# Patient Record
Sex: FEMALE | Race: WHITE | NOT HISPANIC OR LATINO | Employment: FULL TIME | URBAN - METROPOLITAN AREA
[De-identification: names, ages, dates, MRNs, and addresses within clinical notes are randomized per-mention and may not be internally consistent; named-entity substitution may affect disease eponyms.]

---

## 2017-02-07 ENCOUNTER — HOSPITAL ENCOUNTER (EMERGENCY)
Facility: HOSPITAL | Age: 22
End: 2017-02-07
Attending: EMERGENCY MEDICINE

## 2017-02-07 VITALS
TEMPERATURE: 98.9 F | OXYGEN SATURATION: 97 % | BODY MASS INDEX: 31.41 KG/M2 | HEIGHT: 60 IN | HEART RATE: 119 BPM | DIASTOLIC BLOOD PRESSURE: 69 MMHG | RESPIRATION RATE: 20 BRPM | WEIGHT: 160 LBS | SYSTOLIC BLOOD PRESSURE: 129 MMHG

## 2019-06-18 RX ORDER — NITROFURANTOIN 25; 75 MG/1; MG/1
100 CAPSULE ORAL AS NEEDED
COMMUNITY
Start: 2016-10-07 | End: 2021-06-29

## 2019-06-19 ENCOUNTER — HOSPITAL ENCOUNTER (OUTPATIENT)
Facility: HOSPITAL | Age: 24
Setting detail: HOSPITAL OUTPATIENT SURGERY
Discharge: HOME | End: 2019-06-19
Attending: OBSTETRICS & GYNECOLOGY | Admitting: OBSTETRICS & GYNECOLOGY

## 2019-06-19 ENCOUNTER — ANESTHESIA EVENT (OUTPATIENT)
Dept: PERIOP | Facility: HOSPITAL | Age: 24
End: 2019-06-19

## 2019-06-19 ENCOUNTER — ANESTHESIA (OUTPATIENT)
Dept: PERIOP | Facility: HOSPITAL | Age: 24
End: 2019-06-19

## 2019-06-19 VITALS
BODY MASS INDEX: 45.24 KG/M2 | HEART RATE: 78 BPM | OXYGEN SATURATION: 97 % | SYSTOLIC BLOOD PRESSURE: 131 MMHG | HEIGHT: 64 IN | DIASTOLIC BLOOD PRESSURE: 86 MMHG | RESPIRATION RATE: 16 BRPM | TEMPERATURE: 98 F | WEIGHT: 264.99 LBS

## 2019-06-19 LAB
B-HCG UR QL: NEGATIVE
BASOPHILS # BLD AUTO: 0.05 10*3/MM3 (ref 0–0.2)
BASOPHILS NFR BLD AUTO: 0.6 % (ref 0–1.5)
DEPRECATED RDW RBC AUTO: 38.3 FL (ref 37–54)
EOSINOPHIL # BLD AUTO: 0.14 10*3/MM3 (ref 0–0.4)
EOSINOPHIL NFR BLD AUTO: 1.6 % (ref 0.3–6.2)
ERYTHROCYTE [DISTWIDTH] IN BLOOD BY AUTOMATED COUNT: 11.9 % (ref 12.3–15.4)
HCT VFR BLD AUTO: 43.4 % (ref 34–46.6)
HGB BLD-MCNC: 14.9 G/DL (ref 12–15.9)
IMM GRANULOCYTES # BLD AUTO: 0.03 10*3/MM3 (ref 0–0.05)
IMM GRANULOCYTES NFR BLD AUTO: 0.3 % (ref 0–0.5)
INTERNAL NEGATIVE CONTROL: NEGATIVE
INTERNAL POSITIVE CONTROL: POSITIVE
LYMPHOCYTES # BLD AUTO: 3.03 10*3/MM3 (ref 0.7–3.1)
LYMPHOCYTES NFR BLD AUTO: 34.7 % (ref 19.6–45.3)
Lab: NORMAL
MCH RBC QN AUTO: 29.9 PG (ref 26.6–33)
MCHC RBC AUTO-ENTMCNC: 34.3 G/DL (ref 31.5–35.7)
MCV RBC AUTO: 87.1 FL (ref 79–97)
MONOCYTES # BLD AUTO: 0.7 10*3/MM3 (ref 0.1–0.9)
MONOCYTES NFR BLD AUTO: 8 % (ref 5–12)
NEUTROPHILS # BLD AUTO: 4.79 10*3/MM3 (ref 1.7–7)
NEUTROPHILS NFR BLD AUTO: 54.8 % (ref 42.7–76)
NRBC BLD AUTO-RTO: 0 /100 WBC (ref 0–0.2)
PLATELET # BLD AUTO: 246 10*3/MM3 (ref 140–450)
PMV BLD AUTO: 10.4 FL (ref 6–12)
RBC # BLD AUTO: 4.98 10*6/MM3 (ref 3.77–5.28)
WBC NRBC COR # BLD: 8.74 10*3/MM3 (ref 3.4–10.8)

## 2019-06-19 PROCEDURE — 25010000002 PROMETHAZINE PER 50 MG: Performed by: ANESTHESIOLOGY

## 2019-06-19 PROCEDURE — 25010000002 ONDANSETRON PER 1 MG

## 2019-06-19 PROCEDURE — 25010000002 PROPOFOL 10 MG/ML EMULSION: Performed by: NURSE ANESTHETIST, CERTIFIED REGISTERED

## 2019-06-19 PROCEDURE — 25010000002 FENTANYL CITRATE (PF) 100 MCG/2ML SOLUTION: Performed by: NURSE ANESTHETIST, CERTIFIED REGISTERED

## 2019-06-19 PROCEDURE — 25010000002 NEOSTIGMINE PER 0.5 MG: Performed by: NURSE ANESTHETIST, CERTIFIED REGISTERED

## 2019-06-19 PROCEDURE — 25010000002 DEXAMETHASONE PER 1 MG: Performed by: NURSE ANESTHETIST, CERTIFIED REGISTERED

## 2019-06-19 PROCEDURE — 25010000002 EPINEPHRINE PER 0.1 MG: Performed by: OBSTETRICS & GYNECOLOGY

## 2019-06-19 PROCEDURE — 85025 COMPLETE CBC W/AUTO DIFF WBC: CPT | Performed by: OBSTETRICS & GYNECOLOGY

## 2019-06-19 PROCEDURE — 25010000002 ROPIVACAINE PER 1 MG: Performed by: OBSTETRICS & GYNECOLOGY

## 2019-06-19 PROCEDURE — 25010000002 KETOROLAC TROMETHAMINE PER 15 MG: Performed by: NURSE ANESTHETIST, CERTIFIED REGISTERED

## 2019-06-19 PROCEDURE — 81025 URINE PREGNANCY TEST: CPT | Performed by: ANESTHESIOLOGY

## 2019-06-19 PROCEDURE — 25010000002 MIDAZOLAM PER 1 MG: Performed by: ANESTHESIOLOGY

## 2019-06-19 PROCEDURE — 25010000002 ONDANSETRON PER 1 MG: Performed by: NURSE ANESTHETIST, CERTIFIED REGISTERED

## 2019-06-19 RX ORDER — LIDOCAINE HYDROCHLORIDE 10 MG/ML
0.5 INJECTION, SOLUTION EPIDURAL; INFILTRATION; INTRACAUDAL; PERINEURAL ONCE AS NEEDED
Status: DISCONTINUED | OUTPATIENT
Start: 2019-06-19 | End: 2019-06-19 | Stop reason: HOSPADM

## 2019-06-19 RX ORDER — SCOLOPAMINE TRANSDERMAL SYSTEM 1 MG/1
1 PATCH, EXTENDED RELEASE TRANSDERMAL ONCE
Status: DISCONTINUED | OUTPATIENT
Start: 2019-06-19 | End: 2019-06-19 | Stop reason: HOSPADM

## 2019-06-19 RX ORDER — PROMETHAZINE HYDROCHLORIDE 25 MG/1
25 TABLET ORAL ONCE AS NEEDED
Status: DISCONTINUED | OUTPATIENT
Start: 2019-06-19 | End: 2019-06-19 | Stop reason: HOSPADM

## 2019-06-19 RX ORDER — PROMETHAZINE HYDROCHLORIDE 25 MG/ML
6.25 INJECTION, SOLUTION INTRAMUSCULAR; INTRAVENOUS ONCE AS NEEDED
Status: CANCELLED | OUTPATIENT
Start: 2019-06-19

## 2019-06-19 RX ORDER — OXYCODONE AND ACETAMINOPHEN 7.5; 325 MG/1; MG/1
1 TABLET ORAL EVERY 4 HOURS PRN
Status: CANCELLED | OUTPATIENT
Start: 2019-06-19 | End: 2019-06-29

## 2019-06-19 RX ORDER — PROMETHAZINE HYDROCHLORIDE 25 MG/ML
6.25 INJECTION, SOLUTION INTRAMUSCULAR; INTRAVENOUS
Status: DISCONTINUED | OUTPATIENT
Start: 2019-06-19 | End: 2019-06-19 | Stop reason: HOSPADM

## 2019-06-19 RX ORDER — DIPHENHYDRAMINE HCL 25 MG
25 CAPSULE ORAL EVERY 6 HOURS PRN
Status: DISCONTINUED | OUTPATIENT
Start: 2019-06-19 | End: 2019-06-19 | Stop reason: HOSPADM

## 2019-06-19 RX ORDER — HYDROMORPHONE HYDROCHLORIDE 1 MG/ML
0.5 INJECTION, SOLUTION INTRAMUSCULAR; INTRAVENOUS; SUBCUTANEOUS
Status: CANCELLED | OUTPATIENT
Start: 2019-06-19

## 2019-06-19 RX ORDER — CICLOPIROX 1 G/100ML
SHAMPOO TOPICAL
COMMUNITY
Start: 2017-11-17 | End: 2021-08-04

## 2019-06-19 RX ORDER — PROMETHAZINE HYDROCHLORIDE 25 MG/ML
6.25 INJECTION, SOLUTION INTRAMUSCULAR; INTRAVENOUS ONCE
Status: COMPLETED | OUTPATIENT
Start: 2019-06-19 | End: 2019-06-19

## 2019-06-19 RX ORDER — HYDROCODONE BITARTRATE AND ACETAMINOPHEN 7.5; 325 MG/1; MG/1
TABLET ORAL
Status: COMPLETED
Start: 2019-06-19 | End: 2019-06-19

## 2019-06-19 RX ORDER — HYDROMORPHONE HYDROCHLORIDE 1 MG/ML
0.5 INJECTION, SOLUTION INTRAMUSCULAR; INTRAVENOUS; SUBCUTANEOUS
Status: DISCONTINUED | OUTPATIENT
Start: 2019-06-19 | End: 2019-06-19 | Stop reason: HOSPADM

## 2019-06-19 RX ORDER — FENTANYL CITRATE 50 UG/ML
50 INJECTION, SOLUTION INTRAMUSCULAR; INTRAVENOUS
Status: CANCELLED | OUTPATIENT
Start: 2019-06-19

## 2019-06-19 RX ORDER — LIDOCAINE HYDROCHLORIDE 20 MG/ML
INJECTION, SOLUTION INFILTRATION; PERINEURAL AS NEEDED
Status: DISCONTINUED | OUTPATIENT
Start: 2019-06-19 | End: 2019-06-19 | Stop reason: SURG

## 2019-06-19 RX ORDER — ROCURONIUM BROMIDE 10 MG/ML
INJECTION, SOLUTION INTRAVENOUS AS NEEDED
Status: DISCONTINUED | OUTPATIENT
Start: 2019-06-19 | End: 2019-06-19 | Stop reason: SURG

## 2019-06-19 RX ORDER — EPHEDRINE SULFATE 50 MG/ML
5 INJECTION, SOLUTION INTRAVENOUS ONCE AS NEEDED
Status: CANCELLED | OUTPATIENT
Start: 2019-06-19

## 2019-06-19 RX ORDER — NALOXONE HCL 0.4 MG/ML
0.2 VIAL (ML) INJECTION AS NEEDED
Status: DISCONTINUED | OUTPATIENT
Start: 2019-06-19 | End: 2019-06-19 | Stop reason: HOSPADM

## 2019-06-19 RX ORDER — ACETAMINOPHEN 325 MG/1
650 TABLET ORAL ONCE AS NEEDED
Status: DISCONTINUED | OUTPATIENT
Start: 2019-06-19 | End: 2019-06-19 | Stop reason: HOSPADM

## 2019-06-19 RX ORDER — EPHEDRINE SULFATE 50 MG/ML
5 INJECTION, SOLUTION INTRAVENOUS ONCE AS NEEDED
Status: DISCONTINUED | OUTPATIENT
Start: 2019-06-19 | End: 2019-06-19 | Stop reason: HOSPADM

## 2019-06-19 RX ORDER — PROMETHAZINE HYDROCHLORIDE 25 MG/1
25 SUPPOSITORY RECTAL ONCE AS NEEDED
Status: DISCONTINUED | OUTPATIENT
Start: 2019-06-19 | End: 2019-06-19 | Stop reason: HOSPADM

## 2019-06-19 RX ORDER — HYDROCODONE BITARTRATE AND ACETAMINOPHEN 7.5; 325 MG/1; MG/1
1 TABLET ORAL ONCE AS NEEDED
Status: COMPLETED | OUTPATIENT
Start: 2019-06-19 | End: 2019-06-19

## 2019-06-19 RX ORDER — MAGNESIUM HYDROXIDE 1200 MG/15ML
LIQUID ORAL AS NEEDED
Status: DISCONTINUED | OUTPATIENT
Start: 2019-06-19 | End: 2019-06-19 | Stop reason: HOSPADM

## 2019-06-19 RX ORDER — FENTANYL CITRATE 50 UG/ML
50 INJECTION, SOLUTION INTRAMUSCULAR; INTRAVENOUS
Status: DISCONTINUED | OUTPATIENT
Start: 2019-06-19 | End: 2019-06-19 | Stop reason: HOSPADM

## 2019-06-19 RX ORDER — MIDAZOLAM HYDROCHLORIDE 1 MG/ML
1 INJECTION INTRAMUSCULAR; INTRAVENOUS
Status: DISCONTINUED | OUTPATIENT
Start: 2019-06-19 | End: 2019-06-19 | Stop reason: HOSPADM

## 2019-06-19 RX ORDER — HYDROCODONE BITARTRATE AND ACETAMINOPHEN 5; 325 MG/1; MG/1
1-2 TABLET ORAL EVERY 4 HOURS PRN
Qty: 7 TABLET | Refills: 0 | OUTPATIENT
Start: 2019-06-19 | End: 2021-06-29

## 2019-06-19 RX ORDER — DIPHENHYDRAMINE HCL 25 MG
25 CAPSULE ORAL
Status: DISCONTINUED | OUTPATIENT
Start: 2019-06-19 | End: 2019-06-19 | Stop reason: HOSPADM

## 2019-06-19 RX ORDER — FENTANYL CITRATE 50 UG/ML
100 INJECTION, SOLUTION INTRAMUSCULAR; INTRAVENOUS
Status: DISCONTINUED | OUTPATIENT
Start: 2019-06-19 | End: 2019-06-19 | Stop reason: HOSPADM

## 2019-06-19 RX ORDER — ONDANSETRON 2 MG/ML
INJECTION INTRAMUSCULAR; INTRAVENOUS
Status: COMPLETED
Start: 2019-06-19 | End: 2019-06-19

## 2019-06-19 RX ORDER — OXYCODONE AND ACETAMINOPHEN 7.5; 325 MG/1; MG/1
1 TABLET ORAL ONCE AS NEEDED
Status: DISCONTINUED | OUTPATIENT
Start: 2019-06-19 | End: 2019-06-19 | Stop reason: HOSPADM

## 2019-06-19 RX ORDER — SODIUM CHLORIDE 0.9 % (FLUSH) 0.9 %
3 SYRINGE (ML) INJECTION EVERY 12 HOURS SCHEDULED
Status: DISCONTINUED | OUTPATIENT
Start: 2019-06-19 | End: 2019-06-19 | Stop reason: HOSPADM

## 2019-06-19 RX ORDER — PROMETHAZINE HYDROCHLORIDE 25 MG/1
25 SUPPOSITORY RECTAL ONCE AS NEEDED
Status: CANCELLED | OUTPATIENT
Start: 2019-06-19

## 2019-06-19 RX ORDER — PROMETHAZINE HYDROCHLORIDE 25 MG/1
25 TABLET ORAL ONCE AS NEEDED
Status: CANCELLED | OUTPATIENT
Start: 2019-06-19

## 2019-06-19 RX ORDER — SODIUM CHLORIDE, SODIUM LACTATE, POTASSIUM CHLORIDE, CALCIUM CHLORIDE 600; 310; 30; 20 MG/100ML; MG/100ML; MG/100ML; MG/100ML
9 INJECTION, SOLUTION INTRAVENOUS CONTINUOUS
Status: DISCONTINUED | OUTPATIENT
Start: 2019-06-19 | End: 2019-06-19 | Stop reason: HOSPADM

## 2019-06-19 RX ORDER — KETOROLAC TROMETHAMINE 30 MG/ML
INJECTION, SOLUTION INTRAMUSCULAR; INTRAVENOUS AS NEEDED
Status: DISCONTINUED | OUTPATIENT
Start: 2019-06-19 | End: 2019-06-19 | Stop reason: SURG

## 2019-06-19 RX ORDER — ACETAMINOPHEN 650 MG/1
650 SUPPOSITORY RECTAL ONCE AS NEEDED
Status: DISCONTINUED | OUTPATIENT
Start: 2019-06-19 | End: 2019-06-19 | Stop reason: HOSPADM

## 2019-06-19 RX ORDER — FENTANYL CITRATE 50 UG/ML
INJECTION, SOLUTION INTRAMUSCULAR; INTRAVENOUS AS NEEDED
Status: DISCONTINUED | OUTPATIENT
Start: 2019-06-19 | End: 2019-06-19 | Stop reason: SURG

## 2019-06-19 RX ORDER — LABETALOL HYDROCHLORIDE 5 MG/ML
5 INJECTION, SOLUTION INTRAVENOUS
Status: DISCONTINUED | OUTPATIENT
Start: 2019-06-19 | End: 2019-06-19 | Stop reason: HOSPADM

## 2019-06-19 RX ORDER — ONDANSETRON 2 MG/ML
INJECTION INTRAMUSCULAR; INTRAVENOUS AS NEEDED
Status: DISCONTINUED | OUTPATIENT
Start: 2019-06-19 | End: 2019-06-19 | Stop reason: SURG

## 2019-06-19 RX ORDER — PROPOFOL 10 MG/ML
VIAL (ML) INTRAVENOUS AS NEEDED
Status: DISCONTINUED | OUTPATIENT
Start: 2019-06-19 | End: 2019-06-19 | Stop reason: SURG

## 2019-06-19 RX ORDER — DEXAMETHASONE SODIUM PHOSPHATE 10 MG/ML
INJECTION INTRAMUSCULAR; INTRAVENOUS AS NEEDED
Status: DISCONTINUED | OUTPATIENT
Start: 2019-06-19 | End: 2019-06-19 | Stop reason: SURG

## 2019-06-19 RX ORDER — FLUMAZENIL 0.1 MG/ML
0.2 INJECTION INTRAVENOUS AS NEEDED
Status: DISCONTINUED | OUTPATIENT
Start: 2019-06-19 | End: 2019-06-19 | Stop reason: HOSPADM

## 2019-06-19 RX ORDER — ONDANSETRON 2 MG/ML
4 INJECTION INTRAMUSCULAR; INTRAVENOUS ONCE AS NEEDED
Status: CANCELLED | OUTPATIENT
Start: 2019-06-19

## 2019-06-19 RX ORDER — ONDANSETRON HYDROCHLORIDE 8 MG/1
8 TABLET, FILM COATED ORAL EVERY 8 HOURS PRN
Qty: 10 TABLET | Refills: 0 | OUTPATIENT
Start: 2019-06-19 | End: 2021-06-29

## 2019-06-19 RX ORDER — PROMETHAZINE HYDROCHLORIDE 25 MG/ML
12.5 INJECTION, SOLUTION INTRAMUSCULAR; INTRAVENOUS ONCE AS NEEDED
Status: DISCONTINUED | OUTPATIENT
Start: 2019-06-19 | End: 2019-06-19 | Stop reason: HOSPADM

## 2019-06-19 RX ORDER — FAMOTIDINE 10 MG/ML
20 INJECTION, SOLUTION INTRAVENOUS ONCE
Status: COMPLETED | OUTPATIENT
Start: 2019-06-19 | End: 2019-06-19

## 2019-06-19 RX ORDER — HYDRALAZINE HYDROCHLORIDE 20 MG/ML
5 INJECTION INTRAMUSCULAR; INTRAVENOUS
Status: DISCONTINUED | OUTPATIENT
Start: 2019-06-19 | End: 2019-06-19 | Stop reason: HOSPADM

## 2019-06-19 RX ORDER — ONDANSETRON 2 MG/ML
4 INJECTION INTRAMUSCULAR; INTRAVENOUS ONCE AS NEEDED
Status: COMPLETED | OUTPATIENT
Start: 2019-06-19 | End: 2019-06-19

## 2019-06-19 RX ORDER — GLYCOPYRROLATE 0.2 MG/ML
INJECTION INTRAMUSCULAR; INTRAVENOUS AS NEEDED
Status: DISCONTINUED | OUTPATIENT
Start: 2019-06-19 | End: 2019-06-19 | Stop reason: SURG

## 2019-06-19 RX ORDER — MIDAZOLAM HYDROCHLORIDE 1 MG/ML
2 INJECTION INTRAMUSCULAR; INTRAVENOUS
Status: DISCONTINUED | OUTPATIENT
Start: 2019-06-19 | End: 2019-06-19 | Stop reason: HOSPADM

## 2019-06-19 RX ORDER — FLUMAZENIL 0.1 MG/ML
0.2 INJECTION INTRAVENOUS AS NEEDED
Status: CANCELLED | OUTPATIENT
Start: 2019-06-19

## 2019-06-19 RX ORDER — HYDROCODONE BITARTRATE AND ACETAMINOPHEN 7.5; 325 MG/1; MG/1
1 TABLET ORAL ONCE AS NEEDED
Status: CANCELLED | OUTPATIENT
Start: 2019-06-19

## 2019-06-19 RX ORDER — SODIUM CHLORIDE 0.9 % (FLUSH) 0.9 %
1-10 SYRINGE (ML) INJECTION AS NEEDED
Status: DISCONTINUED | OUTPATIENT
Start: 2019-06-19 | End: 2019-06-19 | Stop reason: HOSPADM

## 2019-06-19 RX ADMIN — ONDANSETRON 4 MG: 2 INJECTION INTRAMUSCULAR; INTRAVENOUS at 10:05

## 2019-06-19 RX ADMIN — KETOROLAC TROMETHAMINE 30 MG: 30 INJECTION, SOLUTION INTRAMUSCULAR; INTRAVENOUS at 10:05

## 2019-06-19 RX ADMIN — ROCURONIUM BROMIDE 35 MG: 10 INJECTION, SOLUTION INTRAVENOUS at 09:42

## 2019-06-19 RX ADMIN — MIDAZOLAM 1 MG: 1 INJECTION INTRAMUSCULAR; INTRAVENOUS at 09:29

## 2019-06-19 RX ADMIN — HYDROCODONE BITARTRATE AND ACETAMINOPHEN 1 TABLET: 7.5; 325 TABLET ORAL at 11:04

## 2019-06-19 RX ADMIN — SCOPALAMINE 1 PATCH: 1 PATCH, EXTENDED RELEASE TRANSDERMAL at 08:05

## 2019-06-19 RX ADMIN — SODIUM CHLORIDE, POTASSIUM CHLORIDE, SODIUM LACTATE AND CALCIUM CHLORIDE 9 ML/HR: 600; 310; 30; 20 INJECTION, SOLUTION INTRAVENOUS at 09:29

## 2019-06-19 RX ADMIN — FAMOTIDINE 20 MG: 10 INJECTION INTRAVENOUS at 08:05

## 2019-06-19 RX ADMIN — PROMETHAZINE HYDROCHLORIDE 6.25 MG: 25 INJECTION INTRAMUSCULAR; INTRAVENOUS at 08:07

## 2019-06-19 RX ADMIN — FENTANYL CITRATE 50 MCG: 50 INJECTION INTRAMUSCULAR; INTRAVENOUS at 10:17

## 2019-06-19 RX ADMIN — ONDANSETRON 4 MG: 2 INJECTION INTRAMUSCULAR; INTRAVENOUS at 11:04

## 2019-06-19 RX ADMIN — GLYCOPYRROLATE 0.4 MG: 0.2 INJECTION INTRAMUSCULAR; INTRAVENOUS at 10:05

## 2019-06-19 RX ADMIN — SODIUM CHLORIDE, POTASSIUM CHLORIDE, SODIUM LACTATE AND CALCIUM CHLORIDE: 600; 310; 30; 20 INJECTION, SOLUTION INTRAVENOUS at 09:36

## 2019-06-19 RX ADMIN — PROPOFOL 250 MG: 10 INJECTION, EMULSION INTRAVENOUS at 09:42

## 2019-06-19 RX ADMIN — FENTANYL CITRATE 50 MCG: 50 INJECTION INTRAMUSCULAR; INTRAVENOUS at 09:40

## 2019-06-19 RX ADMIN — DEXAMETHASONE SODIUM PHOSPHATE 8 MG: 10 INJECTION INTRAMUSCULAR; INTRAVENOUS at 09:50

## 2019-06-19 RX ADMIN — Medication 3 MG: at 10:05

## 2019-06-19 RX ADMIN — LIDOCAINE HYDROCHLORIDE 80 MG: 20 INJECTION, SOLUTION INFILTRATION; PERINEURAL at 09:42

## 2019-06-19 NOTE — ANESTHESIA PROCEDURE NOTES
Airway  Urgency: elective    Airway not difficult    General Information and Staff    Patient location during procedure: OR  Anesthesiologist: Maurizio Woods MD  CRNA: Gloria Fine CRNA    Indications and Patient Condition  Indications for airway management: airway protection    Preoxygenated: yes  Mask difficulty assessment: 1 - vent by mask    Final Airway Details  Final airway type: endotracheal airway      Successful airway: ETT  Cuffed: yes   Successful intubation technique: direct laryngoscopy  Facilitating devices/methods: intubating stylet and cricoid pressure  Endotracheal tube insertion site: oral  Blade: Cali  Blade size: 3  ETT size (mm): 7.0  Cormack-Lehane Classification: grade I - full view of glottis  Placement verified by: chest auscultation and capnometry   Cuff volume (mL): 5  Measured from: teeth  ETT to teeth (cm): 20  Number of attempts at approach: 1    Additional Comments  Atraumatic.Dentition as preop.

## 2019-06-19 NOTE — H&P
History of present illness  Hodan is a 24-year-old female  0 who presents to my office with complaints of pelvic pain.  She currently has a Mirena IUD in place and has no regular menses.  She does complain of episodes of pain that are sharp and stabbing.  She is tried over-the-counter nonsteroidal medications without relief.  She has no GI or  complaints.  Possible etiologies have been discussed with the patient including endometriosis.  Patient now seeks further evaluation and treatment with laparoscopy.    Past medical history  Negative    Past surgical history  Oral surgery    Current medications  None    Allergies  No known medical allergies    Social history  Patient denies tobacco use.  States rare alcohol use.    Physical exam  Height 5 feet 5 inches.  Weight 257 pounds.  Heart: Regular rate without murmur  Chest: Clear to auscultation bilaterally  Abdomen: Soft, nontender, no masses  Extremities: Within normal limits  Pelvic exam: External genitalia, vagina, cervix-normal.  Bimanual examination: Uterus in the midline, regular size and shape.  No adnexal masses.    Assessment and plan  Pelvic pain  Diagnostic laparoscopy for evaluation of endometriosis.  The risk and benefits of procedure been explained to Hodan including damage to the urinary tract, bowel, infection, bleeding, and/or anesthetic complications.  Patient desires to proceed with surgery at this time.

## 2019-06-19 NOTE — ANESTHESIA PREPROCEDURE EVALUATION
Anesthesia Evaluation     Patient summary reviewed and Nursing notes reviewed   NPO Solid Status: > 8 hours             Airway   Mallampati: II  TM distance: >3 FB  Neck ROM: full  no difficulty expected  Dental - normal exam     Pulmonary - negative pulmonary ROS and normal exam   Cardiovascular - negative cardio ROS and normal exam        Neuro/Psych- negative ROS  GI/Hepatic/Renal/Endo    (+) morbid obesity,      Musculoskeletal (-) negative ROS    Abdominal  - normal exam   Substance History - negative use     OB/GYN negative ob/gyn ROS         Other                        Anesthesia Plan    ASA 2     general     intravenous induction   Anesthetic plan, all risks, benefits, and alternatives have been provided, discussed and informed consent has been obtained with: patient.    Plan discussed with CRNA.

## 2019-06-19 NOTE — OP NOTE
Subjective     Date of Service:  06/19/19    Surgeon:  Assistant: Rossi Salazar MD  none         Pre-operative diagnosis(es):  Pelvic pain     Post-operative diagnosis(es): Post-Op Diagnosis Codes:  1.  Pelvic pain  2.  Mild endometriosis       Procedure(s): Procedure(s):  DIAGNOSTIC LAPAROSCOPY, cautery of endometriosis           Anesthesia: Type: General       Objective          Specimens removed: None       EBL:    Specimens: minimal    * No orders in the log *                     Complications:      Assessment/Plan  None       Operative Findings:  Time of surgery there is noted to be 1 area on the left uterosacral ligament was consistent with endometriosis. Both tubes and ovaries as well as the uterus were normal in gross appearance.           Description of Procedure:  Patient was taken the operating room with an IV infusing and placed in the operating table in dorsal supine position.  After induction of general endotracheal anesthesia was placed in dorsolithotomy position.  The abdomen perineum and vagina were prepped and draped in usual fashion.  After injection with 1/2% of Neuropin with epinephrine incisions were made one at the umbilicus and a second 5 mm in length just above the pubic symphysis in the midline.  Using a dropping technique the varies needle was introduced the abdominal cavity.  The abdomen was insufflated using CO2 gas.  The varies needle was removed.  A 5 mm trocar was placed with direct visualization through the incision at the umbilicus.  A second puncture was made in similar fashion with a 5 mm trocar.  The abdomen was then visualized and noted to be as previously described.  The area of endometriosis along the left uterosacral ligament was cauterized using a Kleppinger.  Good hemostasis was assured.  CO2 gas was then allowed to escape.  All instruments were removed.  Each incision was closed with 4-0 Vicryl in subcu fashion.  Sterile dressings were applied.  Patient tolerated  procedure without difficulty.  Sponge, instrument and needle counts were correct as reported.  Patient was awakened easily taken to postanesthesia care unit good condition.                     Rossi Salazar MD  06/19/19  10:14 AM

## 2019-06-19 NOTE — ANESTHESIA POSTPROCEDURE EVALUATION
Patient: Hodan Buchanan    Procedure Summary     Date:  06/19/19 Room / Location:   [facility]    Anesthesia Start:  0936 Anesthesia Stop:  1021    Procedure:  DIAGNOSTIC LAPAROSCOPY, cautery of endometriosis (N/A Abdomen) Diagnosis:      Surgeon:  Rossi Salazar MD Provider:  Catracho Giraldo MD    Anesthesia Type:  general ASA Status:  2          Anesthesia Type: general  Last vitals  BP   132/86 (06/19/19 1112)   Temp   36.7 °C (98 °F) (06/19/19 1112)   Pulse   72 (06/19/19 1112)   Resp   16 (06/19/19 1112)     SpO2   100 % (06/19/19 1112)     Post Anesthesia Care and Evaluation    Patient location during evaluation: bedside  Patient participation: complete - patient participated  Level of consciousness: awake  Pain score: 1  Pain management: adequate  Airway patency: patent  Anesthetic complications: No anesthetic complications    Cardiovascular status: acceptable  Respiratory status: acceptable  Hydration status: acceptable    Comments: -------------------------              06/19/19 1112        -------------------------   BP:         132/86        Pulse:        72          Resp:         16          Temp:   36.7 °C (98 °F)   SpO2:        100%        -------------------------

## 2020-12-18 ENCOUNTER — IMMUNIZATION (OUTPATIENT)
Dept: VACCINE CLINIC | Facility: HOSPITAL | Age: 25
End: 2020-12-18

## 2020-12-18 PROCEDURE — 0001A: CPT | Performed by: INTERNAL MEDICINE

## 2020-12-18 PROCEDURE — 91300 HC SARSCOV02 VAC 30MCG/0.3ML IM: CPT | Performed by: INTERNAL MEDICINE

## 2021-01-08 ENCOUNTER — IMMUNIZATION (OUTPATIENT)
Dept: VACCINE CLINIC | Facility: HOSPITAL | Age: 26
End: 2021-01-08

## 2021-01-08 PROCEDURE — 0001A: CPT | Performed by: INTERNAL MEDICINE

## 2021-01-08 PROCEDURE — 91300 HC SARSCOV02 VAC 30MCG/0.3ML IM: CPT | Performed by: INTERNAL MEDICINE

## 2021-01-08 PROCEDURE — 0002A: CPT | Performed by: INTERNAL MEDICINE

## 2021-06-29 ENCOUNTER — HOSPITAL ENCOUNTER (EMERGENCY)
Facility: HOSPITAL | Age: 26
Discharge: HOME | End: 2021-06-29
Attending: EMERGENCY MEDICINE | Admitting: EMERGENCY MEDICINE

## 2021-06-29 VITALS
HEART RATE: 122 BPM | RESPIRATION RATE: 16 BRPM | HEIGHT: 64 IN | OXYGEN SATURATION: 98 % | DIASTOLIC BLOOD PRESSURE: 96 MMHG | WEIGHT: 250 LBS | BODY MASS INDEX: 42.68 KG/M2 | TEMPERATURE: 99 F | SYSTOLIC BLOOD PRESSURE: 159 MMHG

## 2021-06-29 DIAGNOSIS — S16.1XXA CERVICAL STRAIN, ACUTE, INITIAL ENCOUNTER: Primary | ICD-10-CM

## 2021-06-29 DIAGNOSIS — M62.838 TRAPEZIUS MUSCLE SPASM: ICD-10-CM

## 2021-06-29 PROCEDURE — 99282 EMERGENCY DEPT VISIT SF MDM: CPT

## 2021-06-29 RX ORDER — DICLOFENAC SODIUM 75 MG/1
75 TABLET, DELAYED RELEASE ORAL 2 TIMES DAILY PRN
Qty: 15 TABLET | Refills: 0 | Status: ON HOLD | OUTPATIENT
Start: 2021-06-29 | End: 2022-11-07

## 2021-10-12 ENCOUNTER — IMMUNIZATION (OUTPATIENT)
Dept: VACCINE CLINIC | Facility: HOSPITAL | Age: 26
End: 2021-10-12

## 2021-10-12 PROCEDURE — 91300 HC SARSCOV02 VAC 30MCG/0.3ML IM: CPT | Performed by: INTERNAL MEDICINE

## 2021-10-12 PROCEDURE — 0003A: CPT | Performed by: INTERNAL MEDICINE

## 2021-10-12 PROCEDURE — 0004A ADM SARSCOV2 30MCG/0.3ML BOOSTER: CPT | Performed by: INTERNAL MEDICINE

## 2022-04-11 LAB
EXTERNAL HEPATITIS B SURFACE ANTIGEN: NEGATIVE
EXTERNAL HEPATITIS C AB: NORMAL
EXTERNAL RUBELLA QUALITATIVE: NORMAL
EXTERNAL SYPHILIS RPR SCREEN: NORMAL
HIV1 P24 AG SERPL QL IA: NORMAL

## 2022-08-30 ENCOUNTER — HOSPITAL ENCOUNTER (OUTPATIENT)
Facility: HOSPITAL | Age: 27
End: 2022-08-30
Attending: OBSTETRICS & GYNECOLOGY | Admitting: OBSTETRICS & GYNECOLOGY

## 2022-08-30 ENCOUNTER — HOSPITAL ENCOUNTER (EMERGENCY)
Facility: HOSPITAL | Age: 27
Discharge: HOME | End: 2022-08-30
Attending: OBSTETRICS & GYNECOLOGY | Admitting: OBSTETRICS & GYNECOLOGY

## 2022-08-30 VITALS
RESPIRATION RATE: 18 BRPM | BODY MASS INDEX: 50.02 KG/M2 | OXYGEN SATURATION: 99 % | DIASTOLIC BLOOD PRESSURE: 80 MMHG | HEIGHT: 64 IN | TEMPERATURE: 97.8 F | SYSTOLIC BLOOD PRESSURE: 134 MMHG | WEIGHT: 293 LBS | HEART RATE: 86 BPM

## 2022-08-30 LAB
BILIRUB UR QL STRIP: NEGATIVE
CLARITY UR: CLEAR
COLOR UR: YELLOW
GLUCOSE UR STRIP-MCNC: ABNORMAL MG/DL
HGB UR QL STRIP.AUTO: NEGATIVE
KETONES UR QL STRIP: NEGATIVE
LEUKOCYTE ESTERASE UR QL STRIP.AUTO: NEGATIVE
NITRITE UR QL STRIP: NEGATIVE
PH UR STRIP.AUTO: 5.5 [PH] (ref 5–8)
PROT UR QL STRIP: NEGATIVE
SP GR UR STRIP: 1.01 (ref 1–1.03)
UROBILINOGEN UR QL STRIP: ABNORMAL

## 2022-08-30 PROCEDURE — 59025 FETAL NON-STRESS TEST: CPT

## 2022-08-30 PROCEDURE — 99284 EMERGENCY DEPT VISIT MOD MDM: CPT | Performed by: OBSTETRICS & GYNECOLOGY

## 2022-08-30 PROCEDURE — 87086 URINE CULTURE/COLONY COUNT: CPT | Performed by: OBSTETRICS & GYNECOLOGY

## 2022-08-30 PROCEDURE — 81003 URINALYSIS AUTO W/O SCOPE: CPT | Performed by: OBSTETRICS & GYNECOLOGY

## 2022-08-30 RX ORDER — PRENATAL VIT/IRON FUM/FOLIC AC 27MG-0.8MG
1 TABLET ORAL DAILY
COMMUNITY

## 2022-08-30 RX ORDER — CYCLOBENZAPRINE HCL 10 MG
10 TABLET ORAL ONCE
Status: COMPLETED | OUTPATIENT
Start: 2022-08-30 | End: 2022-08-30

## 2022-08-30 RX ADMIN — CYCLOBENZAPRINE 10 MG: 10 TABLET, FILM COATED ORAL at 11:41

## 2022-08-31 LAB — BACTERIA SPEC AEROBE CULT: NO GROWTH

## 2022-09-11 ENCOUNTER — TELEMEDICINE (OUTPATIENT)
Dept: FAMILY MEDICINE CLINIC | Facility: TELEHEALTH | Age: 27
End: 2022-09-11

## 2022-09-11 DIAGNOSIS — Z20.822 EXPOSURE TO COVID-19 VIRUS: Primary | ICD-10-CM

## 2022-09-11 PROCEDURE — BHEMPVIDEOVISIT: Performed by: NURSE PRACTITIONER

## 2022-09-11 NOTE — PROGRESS NOTES
You have chosen to receive care through a telehealth visit.  Do you consent to use a video/audio connection for your medical care today? Yes     CHIEF COMPLAINT  No chief complaint on file.        HPI  Hodan Buchanan is a 27 y.o. female  presents with complaint of  tested positive for COVID today.  She is having symptoms since Tuesday, Wednesday morning and today home test was negative.  Symptoms include loss of taste/smell, cough, shortness of breath with exertion, fatigue, headache, chills, nasal congestion .  Unsure of fever.  Needs PCR for RTW.   employee    She is 30 weeks pregnant    Review of Systems   Constitutional: Positive for chills and fatigue. Negative for diaphoresis.   HENT: Positive for congestion and postnasal drip.    Respiratory: Positive for cough and shortness of breath.    Musculoskeletal: Negative for myalgias.   Neurological: Positive for headaches.   All other systems reviewed and are negative.      Past Medical History:   Diagnosis Date   • Dysmenorrhea    • PONV (postoperative nausea and vomiting)    • UTI (urinary tract infection)     FREQUENT UTI'S       Family History   Problem Relation Age of Onset   • Malig Hyperthermia Neg Hx        Social History     Socioeconomic History   • Marital status:    Tobacco Use   • Smoking status: Never Smoker   • Smokeless tobacco: Never Used   Vaping Use   • Vaping Use: Never used   Substance and Sexual Activity   • Alcohol use: Not Currently     Comment: SOCIALLY   • Drug use: No   • Sexual activity: Defer       Hodan Buchanan  reports that she has never smoked. She has never used smokeless tobacco..              There were no vitals taken for this visit.    PHYSICAL EXAM  Physical Exam   Constitutional: She is oriented to person, place, and time. She appears well-developed and well-nourished. She does not have a sickly appearance. She does not appear ill.   HENT:   Head: Normocephalic and atraumatic.   Pulmonary/Chest: Effort normal.  No  respiratory distress.  Neurological: She is alert and oriented to person, place, and time.         Diagnoses and all orders for this visit:    1. Exposure to COVID-19 virus (Primary)  -     COVID-19,[facility] ROUTINE, NP/OP SWAB IN TRANSPORT MEDIA OR ESWAB 72 HR TAT - Swab, Nasopharynx; Future    --COVID-19 test to rule out infection  --increase fluids, rest, tylenol for fever/pain  --quarantine and symptomatic relief  --notify  that you have had exposure      FOLLOW-UP  As discussed during visit with PCP/[facility] if no improvement or Urgent Care/Emergency Department if worsening of symptoms    Patient verbalizes understanding of medication dosage, comfort measures, instructions for treatment and follow-up.    Katherine Madera, APRN  09/11/2022  17:25 EDT    The use of a video visit has been reviewed with the patient and verbal informed consent has been obtained. Myself and Hodan Buchanan participated in this visit. The patient is located in [address] [place] [address].    I am located in [place], [place]. Mychart and Zoom were utilized. I spent 10 minutes in the patient's chart for this visit.

## 2022-09-11 NOTE — PATIENT INSTRUCTIONS
10 Things You Can Do to Manage Your COVID-19 Symptoms at Home  If you have possible or confirmed COVID-19:  Stay home except to get medical care.  Monitor your symptoms carefully. If your symptoms get worse, call your healthcare provider immediately.  Get rest and stay hydrated.  If you have a medical appointment, call the healthcare provider ahead of time and tell them that you have or may have COVID-19.  For medical emergencies, call 911 and notify the dispatch personnel that you have or may have COVID-19.  Cover your cough and sneezes with a tissue or use the inside of your elbow.  Wash your hands often with soap and water for at least 20 seconds or clean your hands with an alcohol-based hand  that contains at least 60% alcohol.  As much as possible, stay in a specific room and away from other people in your home. Also, you should use a separate bathroom, if available. If you need to be around other people in or outside of the home, wear a mask.  Avoid sharing personal items with other people in your household, like dishes, towels, and bedding.  Clean all surfaces that are touched often, like counters, tabletops, and doorknobs. Use household cleaning sprays or wipes according to the label instructions.  cdc.gov/coronavirus  07/16/2021  This information is not intended to replace advice given to you by your health care provider. Make sure you discuss any questions you have with your health care provider.  Document Revised: 11/01/2021 Document Reviewed: 11/01/2021  [employer] Patient Education © 2021 [employer]  How to Quarantine at Home  Information for Patients and Families    These instructions are for people with confirmed or suspected COVID-19 who do not need to be hospitalized and those with confirmed COVID-19 who were hospitalized and discharged to care for themselves at home.    If you were tested through the Health Department  The Health Department will monitor your wellbeing.  If it is  determined that you do not need to be hospitalized and can be isolated at home, you will be monitored by staff from your local or state health department.     If you were tested through a Commercial Lab  You will need to monitor yourself and report changes in your symptoms to your doctor.  See the section below called Monitor Your Symptoms.    Follow these steps until a healthcare provider or local or state health department says you can return to your normal activities.    Stay home except to get medical care  Restrict activities outside your home, except for getting medical care.   Do not go to work, school, or public areas.   Avoid using public transportation, ride-sharing, or taxis.    Separate yourself from other people and animals in your home  People  As much as possible, you should stay in a specific room and away from other people in your home. Also, you should use a separate bathroom, if available.    Animals  You should restrict contact with pets and other animals while you are sick with COVID-19, just like you would around other people. When possible, have another member of your household care for your animals while you are sick. If you are sick with COVID-19, avoid contact with your pet, including petting, snuggling, being kissed or licked, and sharing food. If you must care for your pet or be around animals while you are sick, wash your hands before and after you interact with pets and wear a facemask. See COVID-19 and Animals for more information.    Call ahead before visiting your doctor  If you have a medical appointment, call the healthcare provider and tell them that you have or may have COVID-19. This information will help the healthcare provider’s office take steps to keep other people from getting infected or exposed.    Wear a facemask  You should wear a facemask when you are around other people (e.g., sharing a room or vehicle) or pets and before you enter a healthcare provider’s office.      If you are not able to wear a facemask (for example, because it causes trouble breathing), then people who live with you should not stay in the same room with you, or they should wear a facemask if they enter your room.    Cover your coughs and sneezes  Cover your mouth and nose with a tissue when you cough or sneeze.   Throw used tissues in a lined trash can.   Immediately wash your hands with soap and water for at least 20 seconds or, if soap and water are not available, clean your hands with an alcohol-based hand  that contains at least 60% alcohol.    Clean your hands often  Wash your hands often with soap and water for at least 20 seconds, especially after blowing your nose, coughing, or sneezing; going to the bathroom; and before eating or preparing food.     If soap and water are not readily available, use an alcohol-based hand  with at least 60% alcohol, covering all surfaces of your hands and rubbing them together until they feel dry.    Soap and water are the best option if hands are visibly dirty. Avoid touching your eyes, nose, and mouth with unwashed hands.    Avoid sharing personal household items  You should not share dishes, drinking glasses, cups, eating utensils, towels, or bedding with other people or pets in your home.   After using these items, they should be washed thoroughly with soap and water.    Clean all “high-touch” surfaces everyday  High touch surfaces include counters, tabletops, doorknobs, bathroom fixtures, toilets, phones, keyboards, tablets, and bedside tables.   Also, clean any surfaces that may have blood, stool, or body fluids on them.   Use a household cleaning spray or wipe, according to the label instructions. Labels contain instructions for safe and effective use of the cleaning product, including precautions you should take when applying the product, such as wearing gloves and making sure you have good ventilation during use of the product.    Monitor  your symptoms  Seek prompt medical attention if your illness is worsening (e.g., difficulty breathing).   Before seeking care, call your healthcare provider and tell them that you have, or are being evaluated for, COVID-19.   Put on a facemask before you enter the facility.     These steps will help the healthcare provider’s office to keep other people in the office or waiting room from getting infected or exposed.   Persons who are placed under active monitoring or facilitated self-monitoring should follow instructions provided by their local health department or occupational health professionals, as appropriate.  If you have a medical emergency and need to call 911, notify the dispatch personnel that you have, or are being evaluated for COVID-19. If possible, put on a facemask before emergency medical services arrive.    Discontinuing home isolation  Patients with confirmed COVID-19 should remain under home isolation precautions until the risk of secondary transmission to others is thought to be low. The decision to discontinue home isolation precautions should be made on a case-by-case basis, in consultation with healthcare providers and state and local health departments.    The below content are for household members, intimate partners, and caregivers of a patient with symptomatic laboratory-confirmed COVID-19 or a patient under investigation:    Household members, intimate partners, and caregivers may have close contact with a person with symptomatic, laboratory-confirmed COVID-19 or a person under investigation.     Close contacts should monitor their health; they should call their healthcare provider right away if they develop symptoms suggestive of COVID-19 (e.g., fever, cough, shortness of breath)     Close contacts should also follow these recommendations:  Make sure that you understand and can help the patient follow their healthcare provider’s instructions for medication(s) and care. You should help the  patient with basic needs in the home and provide support for getting groceries, prescriptions, and other personal needs.  Monitor the patient’s symptoms. If the patient is getting sicker, call his or her healthcare provider and tell them that the patient has laboratory-confirmed COVID-19. This will help the healthcare provider’s office take steps to keep other people in the office or waiting room from getting infected. Ask the healthcare provider to call the local or Critical access hospital health department for additional guidance. If the patient has a medical emergency and you need to call 911, notify the dispatch personnel that the patient has, or is being evaluated for COVID-19.  Household members should stay in another room or be  from the patient as much as possible. Household members should use a separate bedroom and bathroom, if available.  Prohibit visitors who do not have an essential need to be in the home.  Household members should care for any pets in the home. Do not handle pets or other animals while sick.  For more information, see COVID-19 and Animals.  Make sure that shared spaces in the home have good air flow, such as by an air conditioner or an opened window, weather permitting.  Perform hand hygiene frequently. Wash your hands often with soap and water for at least 20 seconds or use an alcohol-based hand  that contains 60 to 95% alcohol, covering all surfaces of your hands and rubbing them together until they feel dry. Soap and water should be used preferentially if hands are visibly dirty.  Avoid touching your eyes, nose, and mouth with unwashed hands.  The patient should wear a facemask when you are around other people. If the patient is not able to wear a facemask (for example, because it causes trouble breathing), you, as the caregiver, should wear a mask when you are in the same room as the patient.  Wear a disposable facemask and gloves when you touch or have contact with the patient’s  blood, stool, or body fluids, such as saliva, sputum, nasal mucus, vomit, or urine.   Throw out disposable facemasks and gloves after using them. Do not reuse.  When removing personal protective equipment, first remove and dispose of gloves. Then, immediately clean your hands with soap and water or alcohol-based hand . Next, remove and dispose of facemask, and immediately clean your hands again with soap and water or alcohol-based hand .  Avoid sharing household items with the patient. You should not share dishes, drinking glasses, cups, eating utensils, towels, bedding, or other items. After the patient uses these items, you should wash them thoroughly (see below “Wash laundry thoroughly”).  Clean all “high-touch” surfaces, such as counters, tabletops, doorknobs, bathroom fixtures, toilets, phones, keyboards, tablets, and bedside tables, every day. Also, clean any surfaces that may have blood, stool, or body fluids on them.   Use a household cleaning spray or wipe, according to the label instructions. Labels contain instructions for safe and effective use of the cleaning product including precautions you should take when applying the product, such as wearing gloves and making sure you have good ventilation during use of the product.  Wash laundry thoroughly.   Immediately remove and wash clothes or bedding that have blood, stool, or body fluids on them.  Wear disposable gloves while handling soiled items and keep soiled items away from your body. Clean your hands (with soap and water or an alcohol-based hand ) immediately after removing your gloves.  Read and follow directions on labels of laundry or clothing items and detergent. In general, using a normal laundry detergent according to washing machine instructions and dry thoroughly using the warmest temperatures recommended on the clothing label.  Place all used disposable gloves, facemasks, and other contaminated items in a lined  container before disposing of them with other household waste. Clean your hands (with soap and water or an alcohol-based hand ) immediately after handling these items. Soap and water should be used preferentially if hands are visibly dirty.  Discuss any additional questions with your state or local health department or healthcare provider.    Adapted from information provided by the Centers for Disease Control and Prevention.  For more information, visit https://www.cdc.gov/coronavirus/2019-ncov/hcp/guidance-prevent-spread.html

## 2022-10-28 LAB — EXTERNAL GROUP B STREP ANTIGEN: NEGATIVE

## 2022-11-07 ENCOUNTER — HOSPITAL ENCOUNTER (OUTPATIENT)
Dept: LABOR AND DELIVERY | Facility: HOSPITAL | Age: 27
Discharge: HOME | End: 2022-11-07

## 2022-11-07 ENCOUNTER — HOSPITAL ENCOUNTER (INPATIENT)
Facility: HOSPITAL | Age: 27
LOS: 4 days | Discharge: HOME | End: 2022-11-11
Attending: OBSTETRICS & GYNECOLOGY | Admitting: OBSTETRICS & GYNECOLOGY

## 2022-11-07 PROBLEM — Z34.90 PREGNANCY: Status: ACTIVE | Noted: 2022-11-07

## 2022-11-07 PROCEDURE — 86901 BLOOD TYPING SEROLOGIC RH(D): CPT | Performed by: OBSTETRICS & GYNECOLOGY

## 2022-11-07 PROCEDURE — 80053 COMPREHEN METABOLIC PANEL: CPT | Performed by: OBSTETRICS & GYNECOLOGY

## 2022-11-07 PROCEDURE — 85025 COMPLETE CBC W/AUTO DIFF WBC: CPT | Performed by: OBSTETRICS & GYNECOLOGY

## 2022-11-07 PROCEDURE — 86850 RBC ANTIBODY SCREEN: CPT | Performed by: OBSTETRICS & GYNECOLOGY

## 2022-11-07 PROCEDURE — 86900 BLOOD TYPING SEROLOGIC ABO: CPT | Performed by: OBSTETRICS & GYNECOLOGY

## 2022-11-07 RX ORDER — ASPIRIN 81 MG/1
81 TABLET, CHEWABLE ORAL DAILY
COMMUNITY
End: 2022-11-11 | Stop reason: HOSPADM

## 2022-11-07 RX ORDER — FAMOTIDINE 10 MG/ML
20 INJECTION, SOLUTION INTRAVENOUS 2 TIMES DAILY PRN
Status: DISCONTINUED | OUTPATIENT
Start: 2022-11-07 | End: 2022-11-09

## 2022-11-07 RX ORDER — SODIUM CHLORIDE 0.9 % (FLUSH) 0.9 %
10 SYRINGE (ML) INJECTION EVERY 12 HOURS SCHEDULED
Status: DISCONTINUED | OUTPATIENT
Start: 2022-11-08 | End: 2022-11-09

## 2022-11-07 RX ORDER — MAGNESIUM CARB/ALUMINUM HYDROX 105-160MG
30 TABLET,CHEWABLE ORAL ONCE AS NEEDED
Status: DISCONTINUED | OUTPATIENT
Start: 2022-11-07 | End: 2022-11-09

## 2022-11-07 RX ORDER — MISOPROSTOL 100 MCG
25 TABLET ORAL
Status: DISPENSED | OUTPATIENT
Start: 2022-11-08 | End: 2022-11-08

## 2022-11-07 RX ORDER — ONDANSETRON 2 MG/ML
4 INJECTION INTRAMUSCULAR; INTRAVENOUS EVERY 6 HOURS PRN
Status: DISCONTINUED | OUTPATIENT
Start: 2022-11-07 | End: 2022-11-09

## 2022-11-07 RX ORDER — ONDANSETRON 4 MG/1
4 TABLET, FILM COATED ORAL EVERY 6 HOURS PRN
Status: DISCONTINUED | OUTPATIENT
Start: 2022-11-07 | End: 2022-11-09

## 2022-11-07 RX ORDER — SODIUM CHLORIDE 0.9 % (FLUSH) 0.9 %
10 SYRINGE (ML) INJECTION AS NEEDED
Status: DISCONTINUED | OUTPATIENT
Start: 2022-11-07 | End: 2022-11-09

## 2022-11-07 RX ORDER — FAMOTIDINE 20 MG/1
20 TABLET, FILM COATED ORAL 2 TIMES DAILY PRN
Status: DISCONTINUED | OUTPATIENT
Start: 2022-11-07 | End: 2022-11-09

## 2022-11-07 RX ORDER — TERBUTALINE SULFATE 1 MG/ML
0.25 INJECTION, SOLUTION SUBCUTANEOUS AS NEEDED
Status: DISCONTINUED | OUTPATIENT
Start: 2022-11-07 | End: 2022-11-09

## 2022-11-07 RX ORDER — SODIUM CHLORIDE, SODIUM LACTATE, POTASSIUM CHLORIDE, CALCIUM CHLORIDE 600; 310; 30; 20 MG/100ML; MG/100ML; MG/100ML; MG/100ML
125 INJECTION, SOLUTION INTRAVENOUS CONTINUOUS
Status: DISCONTINUED | OUTPATIENT
Start: 2022-11-08 | End: 2022-11-09

## 2022-11-07 RX ORDER — ACETAMINOPHEN 325 MG/1
650 TABLET ORAL EVERY 4 HOURS PRN
Status: DISCONTINUED | OUTPATIENT
Start: 2022-11-07 | End: 2022-11-09

## 2022-11-07 RX ADMIN — SODIUM CHLORIDE, POTASSIUM CHLORIDE, SODIUM LACTATE AND CALCIUM CHLORIDE 125 ML/HR: 600; 310; 30; 20 INJECTION, SOLUTION INTRAVENOUS at 23:45

## 2022-11-08 ENCOUNTER — ANESTHESIA EVENT (OUTPATIENT)
Dept: LABOR AND DELIVERY | Facility: HOSPITAL | Age: 27
End: 2022-11-08

## 2022-11-08 ENCOUNTER — ANESTHESIA (OUTPATIENT)
Dept: LABOR AND DELIVERY | Facility: HOSPITAL | Age: 27
End: 2022-11-08

## 2022-11-08 LAB
ABO GROUP BLD: NORMAL
ALBUMIN SERPL-MCNC: 3.4 G/DL (ref 3.5–5.2)
ALBUMIN/GLOB SERPL: 1.5 G/DL
ALP SERPL-CCNC: 108 U/L (ref 39–117)
ALT SERPL W P-5'-P-CCNC: 8 U/L (ref 1–33)
ANION GAP SERPL CALCULATED.3IONS-SCNC: 8 MMOL/L (ref 5–15)
AST SERPL-CCNC: 11 U/L (ref 1–32)
BASOPHILS # BLD AUTO: 0.02 10*3/MM3 (ref 0–0.2)
BASOPHILS NFR BLD AUTO: 0.2 % (ref 0–1.5)
BILIRUB SERPL-MCNC: 0.2 MG/DL (ref 0–1.2)
BLD GP AB SCN SERPL QL: NEGATIVE
BUN SERPL-MCNC: 12 MG/DL (ref 6–20)
BUN/CREAT SERPL: 21.1 (ref 7–25)
CALCIUM SPEC-SCNC: 9.1 MG/DL (ref 8.6–10.5)
CHLORIDE SERPL-SCNC: 106 MMOL/L (ref 98–107)
CO2 SERPL-SCNC: 22 MMOL/L (ref 22–29)
CREAT SERPL-MCNC: 0.57 MG/DL (ref 0.57–1)
DEPRECATED RDW RBC AUTO: 39.6 FL (ref 37–54)
EGFRCR SERPLBLD CKD-EPI 2021: 127.9 ML/MIN/1.73
EOSINOPHIL # BLD AUTO: 0.13 10*3/MM3 (ref 0–0.4)
EOSINOPHIL NFR BLD AUTO: 1.3 % (ref 0.3–6.2)
ERYTHROCYTE [DISTWIDTH] IN BLOOD BY AUTOMATED COUNT: 12.7 % (ref 12.3–15.4)
GLOBULIN UR ELPH-MCNC: 2.3 GM/DL
GLUCOSE SERPL-MCNC: 114 MG/DL (ref 65–99)
HCT VFR BLD AUTO: 33.9 % (ref 34–46.6)
HGB BLD-MCNC: 11.9 G/DL (ref 12–15.9)
IMM GRANULOCYTES # BLD AUTO: 0.1 10*3/MM3 (ref 0–0.05)
IMM GRANULOCYTES NFR BLD AUTO: 1 % (ref 0–0.5)
LYMPHOCYTES # BLD AUTO: 1.61 10*3/MM3 (ref 0.7–3.1)
LYMPHOCYTES NFR BLD AUTO: 16.4 % (ref 19.6–45.3)
MCH RBC QN AUTO: 30.4 PG (ref 26.6–33)
MCHC RBC AUTO-ENTMCNC: 35.1 G/DL (ref 31.5–35.7)
MCV RBC AUTO: 86.5 FL (ref 79–97)
MONOCYTES # BLD AUTO: 0.88 10*3/MM3 (ref 0.1–0.9)
MONOCYTES NFR BLD AUTO: 8.9 % (ref 5–12)
NEUTROPHILS NFR BLD AUTO: 7.1 10*3/MM3 (ref 1.7–7)
NEUTROPHILS NFR BLD AUTO: 72.2 % (ref 42.7–76)
NRBC BLD AUTO-RTO: 0 /100 WBC (ref 0–0.2)
PLATELET # BLD AUTO: 159 10*3/MM3 (ref 140–450)
PMV BLD AUTO: 11.4 FL (ref 6–12)
POTASSIUM SERPL-SCNC: 3.8 MMOL/L (ref 3.5–5.2)
PROT SERPL-MCNC: 5.7 G/DL (ref 6–8.5)
RBC # BLD AUTO: 3.92 10*6/MM3 (ref 3.77–5.28)
RH BLD: POSITIVE
SODIUM SERPL-SCNC: 136 MMOL/L (ref 136–145)
T&S EXPIRATION DATE: NORMAL
WBC NRBC COR # BLD: 9.84 10*3/MM3 (ref 3.4–10.8)

## 2022-11-08 PROCEDURE — C1755 CATHETER, INTRASPINAL: HCPCS | Performed by: ANESTHESIOLOGY

## 2022-11-08 RX ORDER — FENTANYL CIT 0.2 MG/100ML-ROPIV 0.2%-NACL 0.9% EPIDURAL INJ 2/0.2 MCG/ML-%
10 SOLUTION INJECTION CONTINUOUS
Status: DISCONTINUED | OUTPATIENT
Start: 2022-11-08 | End: 2022-11-09

## 2022-11-08 RX ORDER — EPHEDRINE SULFATE 50 MG/ML
5 INJECTION, SOLUTION INTRAVENOUS AS NEEDED
Status: DISCONTINUED | OUTPATIENT
Start: 2022-11-08 | End: 2022-11-09

## 2022-11-08 RX ORDER — DIPHENHYDRAMINE HYDROCHLORIDE 50 MG/ML
12.5 INJECTION INTRAMUSCULAR; INTRAVENOUS EVERY 8 HOURS PRN
Status: DISCONTINUED | OUTPATIENT
Start: 2022-11-08 | End: 2022-11-09

## 2022-11-08 RX ORDER — ROPIVACAINE HYDROCHLORIDE 2 MG/ML
10 INJECTION, SOLUTION EPIDURAL; INFILTRATION; PERINEURAL CONTINUOUS
Status: DISCONTINUED | OUTPATIENT
Start: 2022-11-08 | End: 2022-11-09

## 2022-11-08 RX ORDER — OXYTOCIN/0.9 % SODIUM CHLORIDE 30/500 ML
2-30 PLASTIC BAG, INJECTION (ML) INTRAVENOUS
Status: DISCONTINUED | OUTPATIENT
Start: 2022-11-08 | End: 2022-11-09

## 2022-11-08 RX ORDER — ONDANSETRON 2 MG/ML
4 INJECTION INTRAMUSCULAR; INTRAVENOUS ONCE AS NEEDED
Status: COMPLETED | OUTPATIENT
Start: 2022-11-08 | End: 2022-11-09

## 2022-11-08 RX ORDER — LIDOCAINE HYDROCHLORIDE AND EPINEPHRINE 15; 5 MG/ML; UG/ML
INJECTION, SOLUTION EPIDURAL AS NEEDED
Status: DISCONTINUED | OUTPATIENT
Start: 2022-11-08 | End: 2022-11-09 | Stop reason: SURG

## 2022-11-08 RX ORDER — EPHEDRINE SULFATE 50 MG/ML
10 INJECTION, SOLUTION INTRAVENOUS
Status: DISCONTINUED | OUTPATIENT
Start: 2022-11-08 | End: 2022-11-09

## 2022-11-08 RX ORDER — FAMOTIDINE 10 MG/ML
20 INJECTION, SOLUTION INTRAVENOUS ONCE AS NEEDED
Status: DISCONTINUED | OUTPATIENT
Start: 2022-11-08 | End: 2022-11-09

## 2022-11-08 RX ADMIN — Medication 2 MILLI-UNITS/MIN: at 08:46

## 2022-11-08 RX ADMIN — Medication 8 ML/HR: at 17:03

## 2022-11-08 RX ADMIN — LIDOCAINE HYDROCHLORIDE AND EPINEPHRINE 2 ML: 15; 5 INJECTION, SOLUTION EPIDURAL at 16:58

## 2022-11-08 RX ADMIN — SODIUM CHLORIDE, POTASSIUM CHLORIDE, SODIUM LACTATE AND CALCIUM CHLORIDE 125 ML/HR: 600; 310; 30; 20 INJECTION, SOLUTION INTRAVENOUS at 19:12

## 2022-11-08 RX ADMIN — MISOPROSTOL 25 MCG: 100 TABLET ORAL at 00:15

## 2022-11-08 RX ADMIN — MISOPROSTOL 25 MCG: 100 TABLET ORAL at 04:28

## 2022-11-08 RX ADMIN — SODIUM CHLORIDE, POTASSIUM CHLORIDE, SODIUM LACTATE AND CALCIUM CHLORIDE 125 ML/HR: 600; 310; 30; 20 INJECTION, SOLUTION INTRAVENOUS at 08:00

## 2022-11-08 RX ADMIN — LIDOCAINE HYDROCHLORIDE AND EPINEPHRINE 3 ML: 15; 5 INJECTION, SOLUTION EPIDURAL at 16:57

## 2022-11-08 NOTE — PLAN OF CARE
Problem: Adult Inpatient Plan of Care  Goal: Plan of Care Review  Flowsheets (Taken 2022 0514)  Progress: improving  Plan of Care Reviewed With:   patient   spouse  Outcome Evaluation: IOL for GHTN. Pt has had cytotec x2. Pt states she feels occassional cramping. Plans for a .  Goal: Absence of Hospital-Acquired Illness or Injury  Intervention: Identify and Manage Fall Risk  Recent Flowsheet Documentation  Taken 2022 0430 by Ila Yan RN  Safety Promotion/Fall Prevention: safety round/check completed  Taken 2022 0230 by Ila Yan RN  Safety Promotion/Fall Prevention: safety round/check completed  Taken 2022 003 by Ila Yan RN  Safety Promotion/Fall Prevention: safety round/check completed  Intervention: Prevent Skin Injury  Recent Flowsheet Documentation  Taken 2022 003 by Ila Yan RN  Body Position: position changed independently  Intervention: Prevent and Manage VTE (Venous Thromboembolism) Risk  Recent Flowsheet Documentation  Taken 2022 0030 by Ila Yan RN  Activity Management: up ad quyen  VTE Prevention/Management:   sequential compression devices on   bilateral  Goal: Optimal Comfort and Wellbeing  Intervention: Provide Person-Centered Care  Recent Flowsheet Documentation  Taken 2022 003 by Ila Yan RN  Trust Relationship/Rapport:   care explained   choices provided   emotional support provided   empathic listening provided   questions answered   questions encouraged   reassurance provided   thoughts/feelings acknowledged  Goal: Readiness for Transition of Care  Intervention: Mutually Develop Transition Plan  Recent Flowsheet Documentation  Taken 2022 2343 by Ila Yan RN  Equipment Currently Used at Home: none  Taken 2022 2339 by Ila Yan RN  Equipment Needed After Discharge: none  Equipment Currently Used at Home: none  Anticipated Changes Related to Illness: none  Transportation  Anticipated: car, drives self  Transportation Concerns: none  Concerns to be Addressed:   no discharge needs identified   denies needs/concerns at this time  Readmission Within the Last 30 Days: no previous admission in last 30 days  Patient/Family Anticipated Services at Transition: none  Patient/Family Anticipates Transition to: home     Problem: Change in Fetal Wellbeing (Labor)  Goal: Stable Fetal Wellbeing  Intervention: Promote and Monitor Fetal Wellbeing  Recent Flowsheet Documentation  Taken 2022 003 by Ila Yan RN  Body Position: position changed independently   Goal Outcome Evaluation:  Plan of Care Reviewed With: patient, spouse        Progress: improving  Outcome Evaluation: IOL for GHTN. Pt has had cytotec x2. Pt states she feels occassional cramping. Plans for a .

## 2022-11-08 NOTE — ANESTHESIA PROCEDURE NOTES
Labor Epidural      Patient reassessed immediately prior to procedure    Patient location during procedure: OB  Performed By  Anesthesiologist: Tye Navarrete MD  Preanesthetic Checklist  Completed: patient identified and risks and benefits discussed  Additional Notes    19 gauge catheter.    Gestational Age 38w2d  Prep:  Pt Position:sitting  Sterile Tech:cap, gloves, mask and sterile barrier  Prep:chlorhexidine gluconate and isopropyl alcohol  Monitoring:blood pressure monitoring and EKG  Epidural Block Procedure:  Approach:midline  Guidance:landmark technique and palpation technique  Location:L4-L5  Needle Type:Tuohy  Needle Gauge:17  Loss of Resistance Medium: air  Loss of Resistance: 8cm  Cath Depth at skin:13 cm  Paresthesia: none  Aspiration:negative  Test Dose:negative  Number of Attempts: 2  Post Assessment:  Dressing:occlusive dressing applied and secured with tape  Pt Tolerance:patient tolerated the procedure well with no apparent complications

## 2022-11-08 NOTE — ANESTHESIA PREPROCEDURE EVALUATION
Anesthesia Evaluation     Patient summary reviewed   history of anesthetic complications: PONV               Airway   Mallampati: II  Dental      Pulmonary    (-) sleep apnea, not a smoker    ROS comment: Negative patient screen for CONNIE    Cardiovascular     (+) hypertension,       Neuro/Psych  GI/Hepatic/Renal/Endo    (+) morbid obesity,      Musculoskeletal     Abdominal    Substance History      OB/GYN    (+) Pregnant, pregnancy induced hypertension  (-) Preeclampsia        Other        (-) blood dyscrasia                Anesthesia Plan    ASA 3     epidural     (Intrauterine pregnancy at 38w2d)    Anesthetic plan, risks, benefits, and alternatives have been provided, discussed and informed consent has been obtained with: patient.        CODE STATUS:    Level Of Support Discussed With: Patient  Code Status (Patient has no pulse and is not breathing): CPR (Attempt to Resuscitate)  Medical Interventions (Patient has pulse or is breathing): Full Support  Release to patient: Routine Release

## 2022-11-09 LAB
DEPRECATED RDW RBC AUTO: 40.9 FL (ref 37–54)
ERYTHROCYTE [DISTWIDTH] IN BLOOD BY AUTOMATED COUNT: 12.5 % (ref 12.3–15.4)
HCT VFR BLD AUTO: 32.4 % (ref 34–46.6)
HGB BLD-MCNC: 11.1 G/DL (ref 12–15.9)
MCH RBC QN AUTO: 30.7 PG (ref 26.6–33)
MCHC RBC AUTO-ENTMCNC: 34.3 G/DL (ref 31.5–35.7)
MCV RBC AUTO: 89.8 FL (ref 79–97)
PLATELET # BLD AUTO: 146 10*3/MM3 (ref 140–450)
PMV BLD AUTO: 11.6 FL (ref 6–12)
RBC # BLD AUTO: 3.61 10*6/MM3 (ref 3.77–5.28)
WBC NRBC COR # BLD: 20.54 10*3/MM3 (ref 3.4–10.8)

## 2022-11-09 PROCEDURE — 0KQM0ZZ REPAIR PERINEUM MUSCLE, OPEN APPROACH: ICD-10-PCS | Performed by: OBSTETRICS & GYNECOLOGY

## 2022-11-09 PROCEDURE — 88307 TISSUE EXAM BY PATHOLOGIST: CPT

## 2022-11-09 PROCEDURE — 85027 COMPLETE CBC AUTOMATED: CPT | Performed by: NURSE PRACTITIONER

## 2022-11-09 PROCEDURE — 25010000002 METHYLERGONOVINE MALEATE PER 0.2 MG: Performed by: OBSTETRICS & GYNECOLOGY

## 2022-11-09 PROCEDURE — 25010000002 ONDANSETRON PER 1 MG: Performed by: ANESTHESIOLOGY

## 2022-11-09 RX ORDER — ONDANSETRON 2 MG/ML
4 INJECTION INTRAMUSCULAR; INTRAVENOUS EVERY 6 HOURS PRN
Status: DISCONTINUED | OUTPATIENT
Start: 2022-11-09 | End: 2022-11-11 | Stop reason: HOSPADM

## 2022-11-09 RX ORDER — ACETAMINOPHEN 325 MG/1
650 TABLET ORAL EVERY 6 HOURS PRN
Status: DISCONTINUED | OUTPATIENT
Start: 2022-11-09 | End: 2022-11-11 | Stop reason: HOSPADM

## 2022-11-09 RX ORDER — PROMETHAZINE HYDROCHLORIDE 12.5 MG/1
12.5 SUPPOSITORY RECTAL EVERY 6 HOURS PRN
Status: DISCONTINUED | OUTPATIENT
Start: 2022-11-09 | End: 2022-11-11 | Stop reason: HOSPADM

## 2022-11-09 RX ORDER — ASPIRIN 81 MG/1
81 TABLET, CHEWABLE ORAL DAILY
Status: DISCONTINUED | OUTPATIENT
Start: 2022-11-09 | End: 2022-11-11 | Stop reason: HOSPADM

## 2022-11-09 RX ORDER — ONDANSETRON 4 MG/1
4 TABLET, FILM COATED ORAL EVERY 8 HOURS PRN
Status: DISCONTINUED | OUTPATIENT
Start: 2022-11-09 | End: 2022-11-11 | Stop reason: HOSPADM

## 2022-11-09 RX ORDER — PROMETHAZINE HYDROCHLORIDE 25 MG/1
25 TABLET ORAL EVERY 6 HOURS PRN
Status: DISCONTINUED | OUTPATIENT
Start: 2022-11-09 | End: 2022-11-11 | Stop reason: HOSPADM

## 2022-11-09 RX ORDER — DOCUSATE SODIUM 100 MG/1
100 CAPSULE, LIQUID FILLED ORAL 2 TIMES DAILY
Status: DISCONTINUED | OUTPATIENT
Start: 2022-11-09 | End: 2022-11-11 | Stop reason: HOSPADM

## 2022-11-09 RX ORDER — HYDROXYZINE 50 MG/1
50 TABLET, FILM COATED ORAL NIGHTLY PRN
Status: DISCONTINUED | OUTPATIENT
Start: 2022-11-09 | End: 2022-11-11 | Stop reason: HOSPADM

## 2022-11-09 RX ORDER — OXYCODONE HYDROCHLORIDE AND ACETAMINOPHEN 5; 325 MG/1; MG/1
1 TABLET ORAL EVERY 4 HOURS PRN
Status: DISCONTINUED | OUTPATIENT
Start: 2022-11-09 | End: 2022-11-11 | Stop reason: HOSPADM

## 2022-11-09 RX ORDER — HYDROCORTISONE 25 MG/G
1 CREAM TOPICAL AS NEEDED
Status: DISCONTINUED | OUTPATIENT
Start: 2022-11-09 | End: 2022-11-11 | Stop reason: HOSPADM

## 2022-11-09 RX ORDER — BISACODYL 10 MG
10 SUPPOSITORY, RECTAL RECTAL DAILY PRN
Status: DISCONTINUED | OUTPATIENT
Start: 2022-11-10 | End: 2022-11-11 | Stop reason: HOSPADM

## 2022-11-09 RX ORDER — METHYLERGONOVINE MALEATE 0.2 MG/ML
200 INJECTION INTRAVENOUS ONCE AS NEEDED
Status: COMPLETED | OUTPATIENT
Start: 2022-11-09 | End: 2022-11-09

## 2022-11-09 RX ORDER — ACETAMINOPHEN 500 MG
1000 TABLET ORAL ONCE
Status: COMPLETED | OUTPATIENT
Start: 2022-11-09 | End: 2022-11-09

## 2022-11-09 RX ORDER — PHYTONADIONE 1 MG/.5ML
INJECTION, EMULSION INTRAMUSCULAR; INTRAVENOUS; SUBCUTANEOUS
Status: DISPENSED
Start: 2022-11-09 | End: 2022-11-09

## 2022-11-09 RX ORDER — OXYTOCIN/0.9 % SODIUM CHLORIDE 30/500 ML
250 PLASTIC BAG, INJECTION (ML) INTRAVENOUS CONTINUOUS
Status: DISPENSED | OUTPATIENT
Start: 2022-11-09 | End: 2022-11-09

## 2022-11-09 RX ORDER — MISOPROSTOL 200 UG/1
800 TABLET ORAL ONCE AS NEEDED
Status: DISCONTINUED | OUTPATIENT
Start: 2022-11-09 | End: 2022-11-09

## 2022-11-09 RX ORDER — ERYTHROMYCIN 5 MG/G
OINTMENT OPHTHALMIC
Status: DISPENSED
Start: 2022-11-09 | End: 2022-11-09

## 2022-11-09 RX ORDER — PRENATAL VIT/IRON FUM/FOLIC AC 27MG-0.8MG
1 TABLET ORAL DAILY
Status: DISCONTINUED | OUTPATIENT
Start: 2022-11-09 | End: 2022-11-11 | Stop reason: HOSPADM

## 2022-11-09 RX ORDER — CALCIUM CARBONATE 200(500)MG
2 TABLET,CHEWABLE ORAL 3 TIMES DAILY PRN
Status: DISCONTINUED | OUTPATIENT
Start: 2022-11-09 | End: 2022-11-11 | Stop reason: HOSPADM

## 2022-11-09 RX ORDER — MISOPROSTOL 200 UG/1
600 TABLET ORAL ONCE AS NEEDED
Status: DISCONTINUED | OUTPATIENT
Start: 2022-11-09 | End: 2022-11-11 | Stop reason: HOSPADM

## 2022-11-09 RX ORDER — IBUPROFEN 800 MG/1
800 TABLET ORAL EVERY 8 HOURS PRN
Status: DISCONTINUED | OUTPATIENT
Start: 2022-11-09 | End: 2022-11-11 | Stop reason: HOSPADM

## 2022-11-09 RX ORDER — OXYTOCIN/0.9 % SODIUM CHLORIDE 30/500 ML
999 PLASTIC BAG, INJECTION (ML) INTRAVENOUS ONCE
Status: DISCONTINUED | OUTPATIENT
Start: 2022-11-09 | End: 2022-11-09 | Stop reason: HOSPADM

## 2022-11-09 RX ORDER — TRANEXAMIC ACID 10 MG/ML
1000 INJECTION, SOLUTION INTRAVENOUS ONCE AS NEEDED
Status: DISCONTINUED | OUTPATIENT
Start: 2022-11-09 | End: 2022-11-11 | Stop reason: HOSPADM

## 2022-11-09 RX ORDER — CARBOPROST TROMETHAMINE 250 UG/ML
250 INJECTION, SOLUTION INTRAMUSCULAR
Status: DISCONTINUED | OUTPATIENT
Start: 2022-11-09 | End: 2022-11-09

## 2022-11-09 RX ORDER — OXYCODONE AND ACETAMINOPHEN 10; 325 MG/1; MG/1
1 TABLET ORAL EVERY 4 HOURS PRN
Status: DISCONTINUED | OUTPATIENT
Start: 2022-11-09 | End: 2022-11-11 | Stop reason: HOSPADM

## 2022-11-09 RX ADMIN — DOCUSATE SODIUM 100 MG: 100 CAPSULE, LIQUID FILLED ORAL at 20:13

## 2022-11-09 RX ADMIN — Medication 1 TABLET: at 10:05

## 2022-11-09 RX ADMIN — Medication 8 ML/HR: at 01:18

## 2022-11-09 RX ADMIN — Medication: at 10:05

## 2022-11-09 RX ADMIN — SODIUM CHLORIDE, POTASSIUM CHLORIDE, SODIUM LACTATE AND CALCIUM CHLORIDE 125 ML/HR: 600; 310; 30; 20 INJECTION, SOLUTION INTRAVENOUS at 00:59

## 2022-11-09 RX ADMIN — ONDANSETRON 4 MG: 2 INJECTION INTRAMUSCULAR; INTRAVENOUS at 04:01

## 2022-11-09 RX ADMIN — METHYLERGONOVINE MALEATE 200 MCG: 0.2 INJECTION INTRAVENOUS at 05:38

## 2022-11-09 RX ADMIN — ACETAMINOPHEN 1000 MG: 500 TABLET ORAL at 07:45

## 2022-11-09 RX ADMIN — Medication 250 ML/HR: at 05:49

## 2022-11-09 RX ADMIN — IBUPROFEN 800 MG: 800 TABLET, FILM COATED ORAL at 23:27

## 2022-11-09 RX ADMIN — IBUPROFEN 800 MG: 800 TABLET, FILM COATED ORAL at 07:07

## 2022-11-09 RX ADMIN — DOCUSATE SODIUM 100 MG: 100 CAPSULE, LIQUID FILLED ORAL at 10:05

## 2022-11-09 RX ADMIN — IBUPROFEN 800 MG: 800 TABLET, FILM COATED ORAL at 15:36

## 2022-11-09 RX ADMIN — ASPIRIN 81 MG: 81 TABLET, CHEWABLE ORAL at 15:48

## 2022-11-09 RX ADMIN — ACETAMINOPHEN 650 MG: 325 TABLET, FILM COATED ORAL at 20:12

## 2022-11-09 RX ADMIN — ACETAMINOPHEN 650 MG: 325 TABLET, FILM COATED ORAL at 14:17

## 2022-11-09 NOTE — PLAN OF CARE
Goal Outcome Evaluation:    Patient delivered female infant via  at 0530. Patient had QBL of 915ml, and received methergine as well as second bag of pitocin. Patient's bleeding has remained light with VSS at this time. Plan is to transfer to postpartum unit after recovery.

## 2022-11-09 NOTE — H&P
Pt adm for IOL 38w2d with gest HTN.  H&P reviewed. The patient was examined and there are no changes to the H&P.    Rossi Salazar MD  11/9/2022  06:00 EST

## 2022-11-09 NOTE — ANESTHESIA POSTPROCEDURE EVALUATION
Patient: Hodan Buchanan    Procedure Summary     Date: 11/08/22 Room / Location:     Anesthesia Start: 1642 Anesthesia Stop: 11/09/22 0530    Procedure: LABOR ANALGESIA Diagnosis:     Scheduled Providers:  Provider: Catracho Giraldo MD    Anesthesia Type: epidural ASA Status: 3          Anesthesia Type: epidural    Vitals  Vitals Value Taken Time   /59 11/09/22 0616   Temp 38.4 °C (101.1 °F) 11/09/22 0511   Pulse 111 11/09/22 0616   Resp 16 11/09/22 0615   SpO2             Post Anesthesia Care and Evaluation      Comments: delivered

## 2022-11-09 NOTE — PLAN OF CARE
Goal Outcome Evaluation:  Plan of Care Reviewed With: patient, spouse        Progress: improving  Outcome Evaluation: IOL for GHTN on pitocin after 2 doses of cytotec. Comfortable with epidural, pain 0/10. plans for vaginal delivery and breastfeeding.

## 2022-11-09 NOTE — LACTATION NOTE
Lactation Consult Note  Mom wants to try BF baby and called for help.  Assisted her in attempting to latch baby  in a football position to the left breast. Educated mom starting nose to nipple to obtain deep latch ,but baby was not able to achieve it. PT’s nipples are flat and baby has trouble grasping the nipple and a part of the areola. Also posterior TT noticed. Encouraged parents to talk to the Pediatrician in AM. Hand pump given with instructions of use and cleaning to help protract the nipple. After using it for few minutes baby was able to achieved better latch. She is latching well, has nutritive suckle, and has a good jaw rotation, but is falling asleep easily. Discussed ways to keep baby awake during BF. Encouraged mom to try to BF every 2-3 hours for 15 min. on each side. Discussed ways to keep baby awake during BF. Educated on importance of deep latching, hand expression, different ways to rouse infant and burping her. Mom is able easily to express colostrum. She declines any questions and concerns at this time. Encouraged to call LC if needing further assistance.            Evaluation Completed: 2022 17:18 EST  Patient Name: Hodan Buchanan  :  1995  MRN:  [id number]     REFERRAL  INFORMATION:                          Date of Referral: 22   Person Making Referral: patient  Maternal Reason for Referral: breastfeeding currently  Infant Reason for Referral: tight frenulum, sleepy    DELIVERY HISTORY:        Skin to skin initiation date/time: 2022  5:31 AM   Skin to skin end date/time: 2022  6:30 AM        MATERNAL ASSESSMENT:  Breast Size Issue: none (22 132)  Breast Shape: Bilateral:, pendulous, angled (22 132)  Breast Density: Bilateral:, soft (22 132)  Areola: dense (22 132)  Nipples: flat, other (see comments) (22 132)                INFANT ASSESSMENT:  Information for the patient's :  Dodie Buchanan [[id number]]   No past medical  history on file.     Feeding Readiness Cues: sleeping (22)                     Feeding Interventions: arousal required, jaw supported, latch assistance provided, lips stroked, sucking promoted (22)               Breastfeeding: breastfeeding, left side only (22)   Infant Positioning: clutch/football (22)         Effective Latch During Feeding: yes (22)   Suck/Swallow/Breathing Coordination: present (22)   Signs of Milk Transfer: deep jaw excursions noted (22)       Latch: 1-->repeated attempts, holds nipple in mouth, stimulate to suck (22)   Audible Swallowin-->a few with stimulation (22)   Type of Nipple: 1-->flat (22)   Comfort (Breast/Nipple): 2-->soft/nontender (22)   Hold (Positioning): 0-->full assist (staff holds infant at breast) (22)   Latch Score: 5 (22)                    MATERNAL INFANT FEEDING:     Maternal Emotional State: receptive, relaxed (22)  Infant Positioning: clutch/football (22)   Signs of Milk Transfer: deep jaw excursions noted (22)  Pain with Feeding: no (22)           Milk Ejection Reflex: present (22)           Latch Assistance: full assistance needed (22)                               EQUIPMENT TYPE:                                 BREAST PUMPING:          LACTATION REFERRALS:

## 2022-11-09 NOTE — PROGRESS NOTES
PPD0-- delivered about 530 this am and had pph.  Spiked temp this morning but down now w/ tylenol/ motrin and feels much better.  Has f/c in place w/ blood tinged urine.      FF@U-- scant lochia    Plan to check labs this afternoon- hemodynamically stable now.    D/w routine care.    Catrina Deal, APRN   11/09/22   09:10 EST

## 2022-11-09 NOTE — L&D DELIVERY NOTE
[facility]  Vaginal Delivery Note    Patient Name: Hodan Buchanan  :  1995  MRN:  [id number]          Pregnancy      Labor status: Induction of labor       Delivery     Delivery:      YOB: 2022    Time of Birth: 5:30 AM      Anesthesia: Epidural     Delivering clinician: Rossi Salazar MD       Infant    Findings: Viable female  infant    Infant observations: Weight: No birth weight on file.   Observations/Comments:  Abraham LDR 6      Apgars: 8  @ 1 minute /    9  @ 5 minutes     Placenta, Cord, and Fluid    Placenta delivered  Spontaneous   at  2022  5:34 AM     Cord:   present.               Repair    Episiotomy: No   Lacerations: Second degree perineal         Estimated Blood Loss:  Quantitative Blood Loss:    800 mls.            Delivery narrative: The patient is a 27 y.o.  at 38w3d.  Presented  For IOL. Progressed normally to complete Pushed for 1.25 hrs. Fetal status reassuring throughout.  of viable female infant  @ 5:30 AM  over an intact perineum. Loose CAN x 2.  ASDE. No birth weight on file. .  Placenta delivered spontaneously, intact with 3 vessel cord. Cervix and rectum intact. Heavy blood flow immediately after delivery due to atony. Treated with Pit and Methergine. Second degree laceration repaired in usual fashion with 3-0 vicryl suture. Mother and baby recovering good condition. Baby's name Yaya.      Rossi Salazar MD  22  05:51 EST

## 2022-11-09 NOTE — LACTATION NOTE
This note was copied from a baby's chart.  P1, T baby- new admission. Informed PT that LC is here to help with BF today until 2300. Offered assistance but mother declined, said she will call later, when baby is due to BF if she needs help. Reports infant is latching well.PT denies any questions and concerns at this time. She has PBP. Encouraged to call LC if needing further assistance.

## 2022-11-10 LAB
BASOPHILS # BLD AUTO: 0.03 10*3/MM3 (ref 0–0.2)
BASOPHILS NFR BLD AUTO: 0.2 % (ref 0–1.5)
DEPRECATED RDW RBC AUTO: 40.1 FL (ref 37–54)
EOSINOPHIL # BLD AUTO: 0.16 10*3/MM3 (ref 0–0.4)
EOSINOPHIL NFR BLD AUTO: 1.3 % (ref 0.3–6.2)
ERYTHROCYTE [DISTWIDTH] IN BLOOD BY AUTOMATED COUNT: 12.5 % (ref 12.3–15.4)
HCT VFR BLD AUTO: 29.4 % (ref 34–46.6)
HGB BLD-MCNC: 10 G/DL (ref 12–15.9)
IMM GRANULOCYTES # BLD AUTO: 0.11 10*3/MM3 (ref 0–0.05)
IMM GRANULOCYTES NFR BLD AUTO: 0.9 % (ref 0–0.5)
LYMPHOCYTES # BLD AUTO: 1.43 10*3/MM3 (ref 0.7–3.1)
LYMPHOCYTES NFR BLD AUTO: 11.5 % (ref 19.6–45.3)
MCH RBC QN AUTO: 29.8 PG (ref 26.6–33)
MCHC RBC AUTO-ENTMCNC: 34 G/DL (ref 31.5–35.7)
MCV RBC AUTO: 87.5 FL (ref 79–97)
MONOCYTES # BLD AUTO: 0.83 10*3/MM3 (ref 0.1–0.9)
MONOCYTES NFR BLD AUTO: 6.7 % (ref 5–12)
NEUTROPHILS NFR BLD AUTO: 79.4 % (ref 42.7–76)
NEUTROPHILS NFR BLD AUTO: 9.88 10*3/MM3 (ref 1.7–7)
NRBC BLD AUTO-RTO: 0 /100 WBC (ref 0–0.2)
PLATELET # BLD AUTO: 120 10*3/MM3 (ref 140–450)
PMV BLD AUTO: 11.5 FL (ref 6–12)
RBC # BLD AUTO: 3.36 10*6/MM3 (ref 3.77–5.28)
WBC NRBC COR # BLD: 12.44 10*3/MM3 (ref 3.4–10.8)

## 2022-11-10 PROCEDURE — 85025 COMPLETE CBC W/AUTO DIFF WBC: CPT | Performed by: OBSTETRICS & GYNECOLOGY

## 2022-11-10 RX ORDER — LABETALOL 100 MG/1
100 TABLET, FILM COATED ORAL EVERY 12 HOURS SCHEDULED
Status: DISCONTINUED | OUTPATIENT
Start: 2022-11-10 | End: 2022-11-10

## 2022-11-10 RX ORDER — LABETALOL 100 MG/1
100 TABLET, FILM COATED ORAL EVERY 12 HOURS SCHEDULED
Status: DISCONTINUED | OUTPATIENT
Start: 2022-11-10 | End: 2022-11-11 | Stop reason: HOSPADM

## 2022-11-10 RX ORDER — CETIRIZINE HYDROCHLORIDE 10 MG/1
10 TABLET ORAL DAILY
Status: DISCONTINUED | OUTPATIENT
Start: 2022-11-10 | End: 2022-11-11 | Stop reason: HOSPADM

## 2022-11-10 RX ADMIN — DOCUSATE SODIUM 100 MG: 100 CAPSULE, LIQUID FILLED ORAL at 20:54

## 2022-11-10 RX ADMIN — IBUPROFEN 800 MG: 800 TABLET, FILM COATED ORAL at 15:09

## 2022-11-10 RX ADMIN — ACETAMINOPHEN 650 MG: 325 TABLET, FILM COATED ORAL at 13:49

## 2022-11-10 RX ADMIN — LABETALOL HYDROCHLORIDE 100 MG: 100 TABLET, FILM COATED ORAL at 15:09

## 2022-11-10 RX ADMIN — Medication 1 TABLET: at 07:49

## 2022-11-10 RX ADMIN — ACETAMINOPHEN 650 MG: 325 TABLET, FILM COATED ORAL at 20:54

## 2022-11-10 RX ADMIN — ACETAMINOPHEN 650 MG: 325 TABLET, FILM COATED ORAL at 07:48

## 2022-11-10 RX ADMIN — ACETAMINOPHEN 650 MG: 325 TABLET, FILM COATED ORAL at 02:20

## 2022-11-10 RX ADMIN — CETIRIZINE HYDROCHLORIDE 10 MG: 10 TABLET ORAL at 17:23

## 2022-11-10 RX ADMIN — ASPIRIN 81 MG: 81 TABLET, CHEWABLE ORAL at 07:50

## 2022-11-10 RX ADMIN — DOCUSATE SODIUM 100 MG: 100 CAPSULE, LIQUID FILLED ORAL at 07:48

## 2022-11-10 RX ADMIN — IBUPROFEN 800 MG: 800 TABLET, FILM COATED ORAL at 07:50

## 2022-11-10 NOTE — LACTATION NOTE
This note was copied from a baby's chart.  P1 term baby. Mom reports good supply with hand expression,  baby latches well in football on right breast but having trouble on left side. Encouraged cross-cradle on left and call for any assistance. Baby has had 4wet and 3 stools since midnight.  Discussed ways to know baby is getting enough milk, feeding cues, appropriate amount of output, nursing on demand or every 3 hrs and call for any assistance.

## 2022-11-10 NOTE — LACTATION NOTE
This note was copied from a baby's chart.  Mom called for latch assistance. Baby had nursed x10 minutes right breast and was sleeping upon arrival. We tried moving her to cross cradle on left breast, she suckled a few times then came off crying so Mom moved her back in STS and baby promptly fell asleep. Baby has had 2 additional wet diapers.  Discussed attempting to latch her again in a few hrs and call if needing assistance.

## 2022-11-10 NOTE — PROGRESS NOTES
[facility]  Vaginal Delivery Progress Note    Patient Name: Hodan Buchanan  :  1995  MRN:  [id number]      Subjective   Postpartum Day 1: Vaginal Delivery of a female infant.     The patient feels well without complaints.  Her pain is well controlled.  Reports normal lochia.     The patient plans to breastfeed.    Objective     Vital Signs Range for the last 24 hours  Temperature: Temp:  [97.3 °F (36.3 °C)-98.2 °F (36.8 °C)] 97.6 °F (36.4 °C)       BP: BP: (120-137)/(79-92) 133/90   Pulse: Heart Rate:  [] 92   Respirations: Resp:  [16-18] 18                       Physical Exam:  General: Awake and alert  Abdomen: Fundus: firm, non tender, 1 below umbilicus  Extremities:  trace edema, NT     Labs:     Results from last 7 days   Lab Units 11/10/22  0543 22  1148 22  2344   WBC 10*3/mm3 12.44* 20.54* 9.84   HEMOGLOBIN g/dL 10.0* 11.1* 11.9*   HEMATOCRIT % 29.4* 32.4* 33.9*   PLATELETS 10*3/mm3 120* 146 159       Prenatal labs results reviewed:  Yes   Rubella:  Immune  Rh Status:    RH type   Date Value Ref Range Status   2022 Positive  Final         Assessment & Plan  : 1. PPD1 S/P  - Doing well, continue usual cares.      GHTN-- BP mild range     Was told she is going home today- d/w at length.  Hasn't seen peds yet.  Wants to see how the day goes today.          Pregnancy          Catrina Atwoodchola, APRN  11/10/2022  08:57 EST

## 2022-11-10 NOTE — PLAN OF CARE
Goal Outcome Evaluation:  Plan of Care Reviewed With: patient, spouse    Progress: improving  Outcome Evaluation: VSS. Ambulating independently; voiding spontaneously. Satisfactory pain control with PRN Motrin and Tylenol. Breastfeeding.

## 2022-11-11 VITALS
TEMPERATURE: 98 F | HEART RATE: 99 BPM | DIASTOLIC BLOOD PRESSURE: 84 MMHG | HEIGHT: 63 IN | SYSTOLIC BLOOD PRESSURE: 138 MMHG | BODY MASS INDEX: 51.91 KG/M2 | RESPIRATION RATE: 16 BRPM | WEIGHT: 293 LBS

## 2022-11-11 RX ORDER — IBUPROFEN 800 MG/1
800 TABLET ORAL EVERY 8 HOURS PRN
Qty: 30 TABLET | Refills: 0 | Status: SHIPPED | OUTPATIENT
Start: 2022-11-11

## 2022-11-11 RX ORDER — LABETALOL 100 MG/1
100 TABLET, FILM COATED ORAL EVERY 12 HOURS SCHEDULED
Qty: 60 TABLET | Refills: 0 | Status: SHIPPED | OUTPATIENT
Start: 2022-11-11 | End: 2022-11-14 | Stop reason: SDUPTHER

## 2022-11-11 RX ADMIN — LABETALOL HYDROCHLORIDE 100 MG: 100 TABLET, FILM COATED ORAL at 08:23

## 2022-11-11 RX ADMIN — Medication 1 TABLET: at 08:23

## 2022-11-11 RX ADMIN — IBUPROFEN 800 MG: 800 TABLET, FILM COATED ORAL at 02:32

## 2022-11-11 RX ADMIN — CETIRIZINE HYDROCHLORIDE 10 MG: 10 TABLET ORAL at 08:24

## 2022-11-11 RX ADMIN — ASPIRIN 81 MG: 81 TABLET, CHEWABLE ORAL at 08:24

## 2022-11-11 RX ADMIN — DOCUSATE SODIUM 100 MG: 100 CAPSULE, LIQUID FILLED ORAL at 08:23

## 2022-11-11 NOTE — DISCHARGE SUMMARY
Date of Discharge:  2022    Discharge Diagnosis: s/p vaginal delivery    Presenting Problem/History of Present Illness  Pregnancy [Z34.90]       Hospital Course  Patient is a 27 y.o. female presented for IOL at 38w2d for gHTN. Delivery uncomplicated. Started on labetalol 100mg PO BID postpartum due to mildly elevated BP.      Procedures Performed         Consults:   Consults     No orders found from 10/9/2022 to 2022.          Condition on Discharge:   Subjective   Postpartum Day 2 Vaginal Delivery.    The patient feels well without complaints.    Vital Signs  Temp:  [97.4 °F (36.3 °C)-98.4 °F (36.9 °C)] 98 °F (36.7 °C)  Heart Rate:  [] 86  Resp:  [16-28] 16  BP: (121-139)/(79-90) 130/90    Physical Exam:   General: Awake and alert   Abdomen: Fundus: firm, non tender    Extremities:  Calves NT bilaterally    Assessment & Plan     PPD2  S/P  -   Stable for discharge. Instructions reviewed      Discharge Disposition  Home or Self Care    Discharge Medications     Discharge Medications      New Medications      Instructions Start Date   ibuprofen 800 MG tablet  Commonly known as: ADVIL,MOTRIN   800 mg, Oral, Every 8 Hours PRN      labetalol 100 MG tablet  Commonly known as: NORMODYNE   100 mg, Oral, Every 12 Hours Scheduled         Continue These Medications      Instructions Start Date   Lancets misc   Test four times daily      prenatal vitamin 27-0.8 27-0.8 MG tablet tablet   1 tablet, Oral, Daily         Stop These Medications    aspirin 81 MG chewable tablet              The patient has been prescribed a controlled substance.  She has been counseled on the risks associated with using the medication.   The addictive potential of this medication and alternatives were discussed carefully with this patient and she demonstrated understanding.  A [facility] report has been obtained and reviewed.       Activity at Discharge: restrictions reviewed    Follow-up Appointments  No future  appointments.      Test Results Pending at Discharge       Joann Rondon MD  11/11/22  09:34 EST

## 2022-11-11 NOTE — LACTATION NOTE
This note was copied from a baby's chart.  PT is going home today. Mom reports baby is BF well.  Informed her about the [facility] info and and mommy and Me info on the back of the educational booklet. Educated on baby's expected output and weight gain. Discussed engorgement, mastitis, pumping, milk storage, colostrum expectations and when to expect mature milk supply. PT declines any questions and concerns at this time. Encouraged to call LC if needing further assistance.

## 2023-10-16 RX ORDER — TIZANIDINE 2 MG/1
2 TABLET ORAL DAILY PRN
Qty: 30 TABLET | Refills: 0 | OUTPATIENT
Start: 2023-10-16

## 2024-02-26 RX ORDER — NITROFURANTOIN 25; 75 MG/1; MG/1
100 CAPSULE ORAL 2 TIMES DAILY
Qty: 28 CAPSULE | Refills: 0 | Status: SHIPPED | OUTPATIENT
Start: 2024-02-26 | End: 2024-03-12

## 2024-05-03 ENCOUNTER — OFFICE VISIT (OUTPATIENT)
Dept: FAMILY MEDICINE CLINIC | Facility: CLINIC | Age: 29
End: 2024-05-03
Payer: COMMERCIAL

## 2024-05-03 VITALS
SYSTOLIC BLOOD PRESSURE: 140 MMHG | HEART RATE: 94 BPM | HEIGHT: 63 IN | DIASTOLIC BLOOD PRESSURE: 80 MMHG | BODY MASS INDEX: 51.91 KG/M2 | WEIGHT: 293 LBS | TEMPERATURE: 97.8 F | OXYGEN SATURATION: 99 %

## 2024-05-03 DIAGNOSIS — Z76.89 ENCOUNTER TO ESTABLISH CARE: Primary | ICD-10-CM

## 2024-05-03 DIAGNOSIS — E55.9 VITAMIN D INSUFFICIENCY: ICD-10-CM

## 2024-05-03 DIAGNOSIS — L68.0 HIRSUTISM: ICD-10-CM

## 2024-05-03 DIAGNOSIS — F41.9 ANXIETY: ICD-10-CM

## 2024-05-03 RX ORDER — BUPROPION HYDROCHLORIDE 300 MG/1
300 TABLET ORAL DAILY
Qty: 90 TABLET | Refills: 1 | Status: SHIPPED | OUTPATIENT
Start: 2024-05-10

## 2024-05-03 RX ORDER — BUPROPION HYDROCHLORIDE 150 MG/1
150 TABLET ORAL DAILY
Qty: 7 TABLET | Refills: 0 | Status: SHIPPED | OUTPATIENT
Start: 2024-05-03

## 2024-05-03 RX ORDER — HYDROXYZINE HYDROCHLORIDE 25 MG/1
25 TABLET, FILM COATED ORAL EVERY 8 HOURS PRN
Qty: 30 TABLET | Refills: 0 | Status: SHIPPED | OUTPATIENT
Start: 2024-05-03

## 2024-05-03 RX ORDER — SPIRONOLACTONE 50 MG/1
25 TABLET, FILM COATED ORAL DAILY
Start: 2024-05-03

## 2024-05-03 NOTE — PROGRESS NOTES
Patient ID: Hodan Buchanan is a 28 y.o. female     Patient Care Team:  Head, VIVIENNE Ojeda as PCP - General (Nurse Practitioner)    Subjective     Chief Complaint   Patient presents with    Establish Care    Anxiety       Hodan Buchanan presents to [facility] today to establish care with our practice.  Previous PCP, Dr. Abbey Tiwari.  Last seen for physical on 6/7/2023.      Anxiety  Presents for initial visit. Onset was 6 to 12 months ago. The problem is worse since onset. Symptoms include excessive worry, nervous/anxious behavior, palpitations, panic and malaise/fatigue.  Patient reports no chest pain, decreased concentration, depressed mood, dizziness, hyperventilation, insomnia, nausea, obsessions or shortness of breath. Symptoms occur constantly. The most recent episode lasted 1 hour. The severity of symptoms is interfering with daily activities. Nothing aggravates the symptoms. The patient sleeps 8 hours per night. The quality of sleep is good. Awakens seldom during the night. Her past medical history is significant for anxiety/panic attacks. There is no history of anemia, asthma, depression, hyperthyroidism or suicide attempts. Past treatments include lifestyle changes (wellbutrin). The treatment provided moderate relief.      History of hirsutism.  Tried spironolactone 50 mg however was having issues with dizziness.    She has concerns of difficulty losing weight.  At one time previous PCP had sent in prescription for Ozempic however she never started this medication.  She plans to restart weight watchers which she has completed in the past which worked well for her.  Also unsure if unintentional weight gain and facial hair related to PCOS.  She has not had official testing for this.    Followed by GYN;  IUD in place.  Last pregnancy November 2022.     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  Was treated with Wellbutrin.  Weaned off once she has went to day shift.  Has  been doing well until recently she has been having worsening anxiety.  Usually worse at home.    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    She denies any complaints of fever, chills, cough, chest pain, shortness of air, abdominal pain, nausea, or any other concerns.     The following portions of the patient's history were reviewed and updated as appropriate: allergies, current medications, past family history, past medical history, past social history, past surgical history and problem list.       Review of Systems   Constitutional: Positive for malaise/fatigue.   Cardiovascular:  Positive for palpitations. Negative for chest pain.   Respiratory:  Negative for shortness of breath.    Gastrointestinal:  Negative for nausea.   Neurological:  Negative for dizziness.   Psychiatric/Behavioral:  Negative for decreased concentration. The patient is nervous/anxious. The patient does not have insomnia.        Vitals:    05/03/24 1320   BP: 140/80   Pulse: 94   Temp: 97.8 °F (36.6 °C)   SpO2: 99%       Documented weights    05/03/24 1320   Weight: 135 kg (298 lb)     Body mass index is 52.79 kg/m².    Results for orders placed or performed during the hospital encounter of 11/07/22   Group B Streptococcus Culture - Swab, Vaginal/Rectum    Specimen: Vaginal/Rectum; Swab   Result Value Ref Range    External Strep Group B Ag Negative    Comprehensive Metabolic Panel    Specimen: Blood   Result Value Ref Range    Glucose 114 (H) 65 - 99 mg/dL    BUN 12 6 - 20 mg/dL    Creatinine 0.57 0.57 - 1.00 mg/dL    Sodium 136 136 - 145 mmol/L    Potassium 3.8 3.5 - 5.2 mmol/L    Chloride 106 98 - 107 mmol/L    CO2 22.0 22.0 - 29.0 mmol/L    Calcium 9.1 8.6 - 10.5 mg/dL    Total Protein 5.7 (L) 6.0 - 8.5 g/dL    Albumin 3.40 (L) 3.50 - 5.20 g/dL    ALT (SGPT) 8 1 - 33 U/L    AST (SGOT) 11 1 - 32 U/L    Alkaline Phosphatase 108 39 - 117 U/L    Total Bilirubin 0.2 0.0 - 1.2 mg/dL    Globulin 2.3 gm/dL    A/G Ratio 1.5 g/dL    BUN/Creatinine  Ratio 21.1 7.0 - 25.0    Anion Gap 8.0 5.0 - 15.0 mmol/L    eGFR 127.9 >60.0 mL/min/1.73   CBC Auto Differential    Specimen: Blood   Result Value Ref Range    WBC 9.84 3.40 - 10.80 10*3/mm3    RBC 3.92 3.77 - 5.28 10*6/mm3    Hemoglobin 11.9 (L) 12.0 - 15.9 g/dL    Hematocrit 33.9 (L) 34.0 - 46.6 %    MCV 86.5 79.0 - 97.0 fL    MCH 30.4 26.6 - 33.0 pg    MCHC 35.1 31.5 - 35.7 g/dL    RDW 12.7 12.3 - 15.4 %    RDW-SD 39.6 37.0 - 54.0 fl    MPV 11.4 6.0 - 12.0 fL    Platelets 159 140 - 450 10*3/mm3    Neutrophil % 72.2 42.7 - 76.0 %    Lymphocyte % 16.4 (L) 19.6 - 45.3 %    Monocyte % 8.9 5.0 - 12.0 %    Eosinophil % 1.3 0.3 - 6.2 %    Basophil % 0.2 0.0 - 1.5 %    Immature Grans % 1.0 (H) 0.0 - 0.5 %    Neutrophils, Absolute 7.10 (H) 1.70 - 7.00 10*3/mm3    Lymphocytes, Absolute 1.61 0.70 - 3.10 10*3/mm3    Monocytes, Absolute 0.88 0.10 - 0.90 10*3/mm3    Eosinophils, Absolute 0.13 0.00 - 0.40 10*3/mm3    Basophils, Absolute 0.02 0.00 - 0.20 10*3/mm3    Immature Grans, Absolute 0.10 (H) 0.00 - 0.05 10*3/mm3    nRBC 0.0 0.0 - 0.2 /100 WBC   Hepatitis C Antibody    Specimen: Blood   Result Value Ref Range    External Hepatitis C Ab non-reactive    Hepatitis B Surface Antigen    Specimen: Blood   Result Value Ref Range    External Hepatitis B Surface Ag Negative    RPR    Specimen: Blood   Result Value Ref Range    External RPR Non-Reactive    Rubella Antibody, IgG    Specimen: Blood   Result Value Ref Range    External Rubella Qual Immune    HIV-1 Antibody, EIA    Specimen: Blood   Result Value Ref Range    External HIV Antibody Non-Reactive    CBC (No Diff)    Specimen: Blood   Result Value Ref Range    WBC 20.54 (H) 3.40 - 10.80 10*3/mm3    RBC 3.61 (L) 3.77 - 5.28 10*6/mm3    Hemoglobin 11.1 (L) 12.0 - 15.9 g/dL    Hematocrit 32.4 (L) 34.0 - 46.6 %    MCV 89.8 79.0 - 97.0 fL    MCH 30.7 26.6 - 33.0 pg    MCHC 34.3 31.5 - 35.7 g/dL    RDW 12.5 12.3 - 15.4 %    RDW-SD 40.9 37.0 - 54.0 fl    MPV 11.6 6.0 - 12.0 fL     Platelets 146 140 - 450 10*3/mm3   CBC Auto Differential    Specimen: Blood   Result Value Ref Range    WBC 12.44 (H) 3.40 - 10.80 10*3/mm3    RBC 3.36 (L) 3.77 - 5.28 10*6/mm3    Hemoglobin 10.0 (L) 12.0 - 15.9 g/dL    Hematocrit 29.4 (L) 34.0 - 46.6 %    MCV 87.5 79.0 - 97.0 fL    MCH 29.8 26.6 - 33.0 pg    MCHC 34.0 31.5 - 35.7 g/dL    RDW 12.5 12.3 - 15.4 %    RDW-SD 40.1 37.0 - 54.0 fl    MPV 11.5 6.0 - 12.0 fL    Platelets 120 (L) 140 - 450 10*3/mm3    Neutrophil % 79.4 (H) 42.7 - 76.0 %    Lymphocyte % 11.5 (L) 19.6 - 45.3 %    Monocyte % 6.7 5.0 - 12.0 %    Eosinophil % 1.3 0.3 - 6.2 %    Basophil % 0.2 0.0 - 1.5 %    Immature Grans % 0.9 (H) 0.0 - 0.5 %    Neutrophils, Absolute 9.88 (H) 1.70 - 7.00 10*3/mm3    Lymphocytes, Absolute 1.43 0.70 - 3.10 10*3/mm3    Monocytes, Absolute 0.83 0.10 - 0.90 10*3/mm3    Eosinophils, Absolute 0.16 0.00 - 0.40 10*3/mm3    Basophils, Absolute 0.03 0.00 - 0.20 10*3/mm3    Immature Grans, Absolute 0.11 (H) 0.00 - 0.05 10*3/mm3    nRBC 0.0 0.0 - 0.2 /100 WBC   Type & Screen    Specimen: Blood   Result Value Ref Range    ABO Type O     RH type Positive     Antibody Screen Negative     T&S Expiration Date 11/10/2022 11:59:59 PM            Objective     Physical Exam  Constitutional:       Appearance: She is well-developed.   HENT:      Head: Normocephalic and atraumatic.      Right Ear: Tympanic membrane normal.      Left Ear: Tympanic membrane normal.   Eyes:      Pupils: Pupils are equal, round, and reactive to light.   Cardiovascular:      Rate and Rhythm: Normal rate and regular rhythm.      Heart sounds: Normal heart sounds. No murmur heard.  Pulmonary:      Effort: Pulmonary effort is normal.      Breath sounds: Normal breath sounds. No wheezing, rhonchi or rales.   Abdominal:      General: Bowel sounds are normal.      Palpations: Abdomen is soft.      Tenderness: There is no abdominal tenderness.   Musculoskeletal:         General: No tenderness. Normal range of  motion.      Cervical back: Normal range of motion and neck supple.   Skin:     General: Skin is warm and dry.      Findings: No erythema or rash.   Neurological:      Mental Status: She is alert and oriented to person, place, and time.   Psychiatric:         Behavior: Behavior normal.            Assessment & Plan     Assessment/Plan     Diagnoses and all orders for this visit:    1. Encounter to establish care (Primary)    2. Anxiety  -     CBC (No Diff); Future  -     Comprehensive Metabolic Panel; Future  -     TSH Rfx On Abnormal To Free T4; Future  -     buPROPion XL (Wellbutrin XL) 150 MG 24 hr tablet; Take 1 tablet by mouth Daily.  Dispense: 7 tablet; Refill: 0  -     buPROPion XL (Wellbutrin XL) 300 MG 24 hr tablet; Take 1 tablet by mouth Daily.  Dispense: 90 tablet; Refill: 1  -     hydrOXYzine (ATARAX) 25 MG tablet; Take 1 tablet by mouth Every 8 (Eight) Hours As Needed for Anxiety.  Dispense: 30 tablet; Refill: 0    3. Hirsutism  -     Hemoglobin A1c; Future  -     Insulin, Total; Future    4. Vitamin D insufficiency  -     Vitamin D,25-Hydroxy; Future    Other orders  -     spironolactone (ALDACTONE) 50 MG tablet; Take 0.5 tablets by mouth Daily.          Summary:  Hodan Buchanan presented to office today to establish care with our practice.  Primary concern is worsening anxiety.  She has taken Wellbutrin in the past with good control of symptoms.  Will resume same.  Also sent a prescription for hydroxyzine which she can take every 8 hours as needed.  Will send her a [employer] message in about 4-6 weeks to see how she is doing.  She will have fasting blood work completed at [facility] at her earliest convenience.  Will notify her of results.  Concerning spironolactone, advised to try half a tablet daily to see if tolerates.  Monitor blood pressure at home and let us know if any fluctuations or concerns.  Currently plans on trying to manage weight with watching her diet and following weight  watchers.    Follow Up:  Return in about 1 year (around 5/3/2025) for Annual.    In the meantime, instructed to contact us sooner for any problems or concerns.    Patient was given instructions and counseling regarding condition or for health maintenance advice.  Please see specific information pulled into the AVS if appropriate.          Emma Dwyer, APRN  Family Medicine  [facility]  05/03/24  17:27 EDT

## 2024-07-11 RX ORDER — SPIRONOLACTONE 50 MG/1
25 TABLET, FILM COATED ORAL DAILY
Start: 2024-07-11 | End: 2024-07-11 | Stop reason: SDUPTHER

## 2024-07-11 RX ORDER — SPIRONOLACTONE 50 MG/1
25 TABLET, FILM COATED ORAL DAILY
Qty: 30 TABLET | Refills: 0 | Status: SHIPPED | OUTPATIENT
Start: 2024-07-11

## 2024-07-11 RX ORDER — SPIRONOLACTONE 50 MG/1
25 TABLET, FILM COATED ORAL DAILY
Qty: 30 TABLET | Refills: 0 | OUTPATIENT
Start: 2024-07-11

## 2024-08-09 ENCOUNTER — TELEPHONE (OUTPATIENT)
Dept: FAMILY MEDICINE CLINIC | Facility: CLINIC | Age: 29
End: 2024-08-09
Payer: COMMERCIAL

## 2024-08-26 ENCOUNTER — OFFICE VISIT (OUTPATIENT)
Dept: FAMILY MEDICINE CLINIC | Facility: CLINIC | Age: 29
End: 2024-08-26
Payer: COMMERCIAL

## 2024-08-26 VITALS
WEIGHT: 290 LBS | DIASTOLIC BLOOD PRESSURE: 86 MMHG | OXYGEN SATURATION: 98 % | SYSTOLIC BLOOD PRESSURE: 128 MMHG | BODY MASS INDEX: 49.51 KG/M2 | HEIGHT: 64 IN | HEART RATE: 108 BPM | TEMPERATURE: 97.7 F

## 2024-08-26 DIAGNOSIS — M54.16 LUMBAR RADICULOPATHY, RIGHT: Primary | ICD-10-CM

## 2024-08-26 PROCEDURE — 99214 OFFICE O/P EST MOD 30 MIN: CPT

## 2024-08-26 RX ORDER — MELOXICAM 7.5 MG/1
7.5 TABLET ORAL DAILY
Qty: 14 TABLET | Refills: 0 | Status: SHIPPED | OUTPATIENT
Start: 2024-08-26

## 2024-08-26 NOTE — PROGRESS NOTES
"Answers submitted by the patient for this visit:  Primary Reason for Visit (Submitted on 8/26/2024)  What is the primary reason for your visit?: Back Pain    Chief Complaint  Back Pain (Lower back, radiating down right leg)    Subjective        Hodan Buchanan presents to [facility]   History of Present Illness  Hodan presents today with low back pain since 8/7. She woke up that morning with pain with no known injury. Pain is described as aching with burning radiating down the right leg with numbness/tingling in the right foot. She feels she is having muscle spasms in the lumbar back and right gluteus muscles. Endorses previous back pain and need for PT after the birth of her child 1.5 years ago. Was first seen at Urgent Care and given a medrol dose pack and diclofenac gel. She then went to the ED later that day for imaging because of concern for mild weakness in the right leg. Plain films showed no bony abnormality or alignment issue. She was given decadron IM, hydrocodone APAP, na cyclobenzaprine. Denies bowel or bladder incontinence or muscle weakness today. She reports that she had some pain relief with the steroids and meloxicam but the muscle relaxer didn't help. Wants a referral to ortho/sports medicine and an MRI for further evaluation since pain has not resolved with conservative measures.     UC with Sammi Boyce APRN (08/07/2024)    ED with Jose Damon MD (08/07/2024)    Objective   Vital Signs:  /86 (BP Location: Left arm, Patient Position: Sitting, Cuff Size: Large Adult)   Pulse 108   Temp 97.7 °F (36.5 °C)   Ht 162.6 cm (64.02\")   Wt 132 kg (290 lb)   SpO2 98%   BMI 49.75 kg/m²   Estimated body mass index is 49.75 kg/m² as calculated from the following:    Height as of this encounter: 162.6 cm (64.02\").    Weight as of this encounter: 132 kg (290 lb).            Physical Exam  Constitutional:       General: She is not in acute distress.     " Appearance: She is obese.   Cardiovascular:      Rate and Rhythm: Normal rate.   Pulmonary:      Effort: Pulmonary effort is normal.   Musculoskeletal:      Lumbar back: No swelling, signs of trauma, tenderness or bony tenderness. Decreased range of motion. Negative right straight leg raise test and negative left straight leg raise test.      Comments: Positive for pain with ROM with twisting and flexion   Neurological:      Mental Status: She is alert and oriented to person, place, and time.      Motor: No weakness.   Psychiatric:         Mood and Affect: Mood normal.         Behavior: Behavior normal.        Result Review :                Assessment and Plan   Diagnoses and all orders for this visit:    1. Lumbar radiculopathy, right (Primary)  Chronic, with acute exacerbation.  -     meloxicam (Mobic) 7.5 MG tablet; Take 1 tablet by mouth Daily.  Dispense: 14 tablet; Refill: 0  -     Ambulatory Referral to Orthopedic Surgery  -     MRI Lumbar Spine Without Contrast; Future  -     Ambulatory Referral to Physical Therapy for Evaluation & Treatment   Referrals placed for PT and ortho as well as  MRI ordered for further evaluation and management of continued lumbar radiculopathy despite conservative treatment.            Follow Up   Return if symptoms worsen or fail to improve.  Patient was given instructions and counseling regarding her condition or for health maintenance advice. Please see specific information pulled into the AVS if appropriate.

## 2024-09-02 RX ORDER — PREDNISONE 10 MG/1
TABLET ORAL
Qty: 21 TABLET | Refills: 0 | Status: SHIPPED | OUTPATIENT
Start: 2024-09-02 | End: 2024-09-04

## 2024-09-03 RX ORDER — SPIRONOLACTONE 50 MG/1
25 TABLET, FILM COATED ORAL DAILY
Qty: 30 TABLET | Refills: 0 | Status: SHIPPED | OUTPATIENT
Start: 2024-09-03

## 2024-09-04 RX ORDER — METHYLPREDNISOLONE 4 MG
TABLET, DOSE PACK ORAL
Qty: 21 TABLET | Refills: 1 | Status: SHIPPED | OUTPATIENT
Start: 2024-09-04

## 2024-09-09 ENCOUNTER — HOSPITAL ENCOUNTER (OUTPATIENT)
Dept: MRI IMAGING | Facility: HOSPITAL | Age: 29
Discharge: HOME | End: 2024-09-09
Payer: COMMERCIAL

## 2024-09-09 DIAGNOSIS — M54.16 LUMBAR RADICULOPATHY, RIGHT: ICD-10-CM

## 2024-09-09 PROCEDURE — 72148 MRI LUMBAR SPINE W/O DYE: CPT

## 2024-09-09 NOTE — PROGRESS NOTES
Subjective   Patient ID: Hodan Buchanan is a 29 y.o. female is here today as a self referral with low back pain that radiates to the R leg. Patient had a MRI L-spine on 9/9/2024.    Treatment: No recent treatment     Today patient states that she  has low back pain that radiates to the R leg     History of Present Illness    This patient awakened with pain in her right lower back and radiating into her right leg about a month ago.  At first the pain was so severe she could barely move.  She has taken some oral steroids which helped a little bit.  But she has had no other treatment so far.  Currently the pain is located primarily in her right buttock down her posterolateral thigh posterolateral calf and into her right ankle and right foot.  The pain is sharp and stabbing and quite severe.  It is worse at night when she lays down.  The left side is okay.    The following portions of the patient's history were reviewed and updated as appropriate: allergies, current medications, past family history, past medical history, past social history, past surgical history, and problem list.    Review of Systems   Constitutional:  Negative for chills and fever.   HENT:  Negative for congestion.    Genitourinary:  Negative for difficulty urinating and dysuria.   Musculoskeletal:  Positive for back pain and myalgias.   Neurological:  Positive for weakness and numbness.       I reviewed the review of systems listed by the patient and discussed by my MA    Objective     There were no vitals filed for this visit.  There is no height or weight on file to calculate BMI.    Tobacco Use: Low Risk  (9/10/2024)    Patient History     Smoking Tobacco Use: Never     Smokeless Tobacco Use: Never     Passive Exposure: Not on file          Physical Exam  Constitutional:       Appearance: She is well-developed.   HENT:      Head: Normocephalic and atraumatic.   Eyes:      General: Lids are normal.      Extraocular Movements: Extraocular movements  intact.      Conjunctiva/sclera: Conjunctivae normal.      Pupils: Pupils are equal, round, and reactive to light.   Neck:      Vascular: No carotid bruit.   Neurological:      Deep Tendon Reflexes:      Reflex Scores:       Tricep reflexes are 2+ on the right side and 2+ on the left side.       Bicep reflexes are 2+ on the right side and 2+ on the left side.       Brachioradialis reflexes are 2+ on the right side and 2+ on the left side.       Patellar reflexes are 2+ on the right side and 2+ on the left side.       Achilles reflexes are 0 on the right side and 2+ on the left side.      Neurological Exam  Mental Status  Awake, alert and oriented to person, place and time. Oriented to person, place and time.    Cranial Nerves  CN II: Visual acuity is normal. Visual fields full to confrontation.  CN III, IV, VI: Extraocular movements intact bilaterally. Normal lids and orbits bilaterally. Pupils equal round and reactive to light bilaterally.  CN V: Facial sensation is normal.  CN VII: Full and symmetric facial movement.  CN IX, X: Palate elevates symmetrically. Normal gag reflex.  CN XI: Shoulder shrug strength is normal.  CN XII: Tongue midline without atrophy or fasciculations.    Motor   Strength is 5/5 in all four extremities except as noted.  Patient has mild plantar flexor weakness on the right..    Sensory  Sensation is intact to light touch, pinprick, vibration and proprioception in all four extremities.    Reflexes                                            Right                      Left  Brachioradialis                    2+                         2+  Biceps                                 2+                         2+  Triceps                                2+                         2+  Finger flex                           2+                         2+  Hamstring                            2+                         2+  Patellar                                2+                         2+  Achilles                                 0                         2+    Gait  Normal casual, toe, heel and tandem gait.          Assessment & Plan   Independent Review of Radiographic Studies:      I personally reviewed the images from the following studies.    I reviewed plain films done on 7 August of this year.  This shows minimal degenerative disease and no overt instability.  I then reviewed an MRI of the lumbar spine done on 9 September of this year.  This looks pretty good on the sagittal images.  On the axial images T12-L1 and L1 to look okay.  23 is also open.  L3-4 is open.  L4-5 shows a central disc bulge with some deviation of the thecal sac but no compression of the nerves.  L5-S1 shows a moderate size disc herniation on the right.    Medical Decision Making:      I told the patient and her mom about the imaging.  I told her we should really try some nonsurgical treatment before we do anything else.  I recommended an oral steroid again as well as an epidural block and physical therapy.  I will see her back in a couple of weeks.    Diagnoses and all orders for this visit:    1. Neuritis or radiculitis due to rupture of lumbar intervertebral disc (Primary)  -     Ambulatory Referral to Physical Therapy for Evaluation & Treatment  -     Epidural Block    Other orders  -     dexAMETHasone (DECADRON) 1.5 MG tablet; 3 bid x 4d, 3 q AM and 2 q PM x 1d, 2 bid x 3d, 2 q AM and 1 q PM x 1d, 1 bid x 3d, 1 q AM until gone  Dispense: 51 tablet; Refill: 0      Return in about 2 weeks (around 9/24/2024).

## 2024-09-10 ENCOUNTER — OFFICE VISIT (OUTPATIENT)
Dept: NEUROSURGERY | Facility: CLINIC | Age: 29
End: 2024-09-10
Payer: COMMERCIAL

## 2024-09-10 DIAGNOSIS — M51.16 NEURITIS OR RADICULITIS DUE TO RUPTURE OF LUMBAR INTERVERTEBRAL DISC: Primary | ICD-10-CM

## 2024-09-10 PROCEDURE — 99204 OFFICE O/P NEW MOD 45 MIN: CPT | Performed by: NEUROLOGICAL SURGERY

## 2024-09-10 RX ORDER — DEXAMETHASONE 1.5 MG/1
TABLET ORAL
Qty: 51 TABLET | Refills: 0 | Status: SHIPPED | OUTPATIENT
Start: 2024-09-10

## 2024-09-11 ENCOUNTER — TREATMENT (OUTPATIENT)
Dept: PHYSICAL THERAPY | Facility: CLINIC | Age: 29
End: 2024-09-11
Payer: COMMERCIAL

## 2024-09-11 DIAGNOSIS — M79.604 RIGHT LEG PAIN: ICD-10-CM

## 2024-09-11 DIAGNOSIS — M54.16 RADICULOPATHY, LUMBAR REGION: Primary | ICD-10-CM

## 2024-09-11 NOTE — PROGRESS NOTES
Physical Therapy Initial Evaluation and Plan of Care    [facility]  [address] [id number] [phone] (phone)  [phone] (fax)    Patient: Hodan Buchanan   : 1995  Diagnosis/ICD-10 Code:  Radiculopathy, lumbar region [M54.16]  Referring practitioner: Evan Jung MD  Date of Initial Visit: 2024  Today's Date: 2024  Patient seen for 1 sessions           Past Medical History:   Diagnosis Date    Dysmenorrhea     Endometriosis     Gestational HTN     PONV (postoperative nausea and vomiting)     UTI (urinary tract infection)     FREQUENT UTI'S        Past Surgical History:   Procedure Laterality Date    DIAGNOSTIC LAPAROSCOPY N/A 2019    Procedure: DIAGNOSTIC LAPAROSCOPY, cautery of endometriosis;  Surgeon: Rossi Salazar MD;  Location: [facility] OR [facility];  Service: Obstetrics/Gynecology    WISDOM TOOTH EXTRACTION          Subjective Evaluation    History of Present Illness  Mechanism of injury: Patient complains of right lower back that radiates into her right leg. Pain began insidiously about a month ago. At first the pain was so severe she could barely move. The night before the pain began patient recalls doing some stretches (however stretches weren't new and weren't painful). Patient has history of mild LBP from pregnancy and birth of daughter (2 years)    She has taken some oral steroids which helped a little bit.  Currently the pain is located primarily in her right buttock down her posterolateral thigh, posterolateral calf, and into her right ankle and right foot. The pain is sharp and stabbing and quite severe with associated numbness. Pain is provoked by transitions. It is also worse at night when she lays down. No L LE symptoms    I reviewed plain films done on  of this year. This shows minimal degenerative disease and no overt instability. I then reviewed an MRI of the lumbar spine done on  of this year. This looks pretty  good on the sagittal images. On the axial images T12-L1 and L1 to look okay. 23 is also open. L3-4 is open. L4-5 shows a central disc bulge with some deviation of the thecal sac but no compression of the nerves. L5-S1 shows a moderate size disc herniation on the right.     Per Dr. Jung note: Plan is conservative management. Recommended an oral steroid again (took first dose this morning) as well as an epidural block (to be scheduled) and physical therapy     PLOF: Enjoys walking for leisure. Cares for 2 year old daughter. 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[employer]) Quality of life: good    Pain  Current pain ratin  At best pain ratin  At worst pain ratin  Location: Low back/R LE  Quality: sharp, radiating and knife-like  Aggravating factors: sleeping, stairs and prolonged positioning  Progression: worsening    Social Support  Lives in: one-story house  Lives with: young children and spouse    Diagnostic Tests  MRI studies: abnormal    Treatments  Previous treatment: medication  Patient Goals  Patient goals for therapy: decreased pain, increased motion and return to sport/leisure activities           Objective          Postural Observations  Seated posture: fair  Standing posture: good  Correction of posture: has no consistent effect      Tenderness     Lumbar Spine  Tenderness in the spinous process.     Neurological Testing     Sensation     Lumbar   Left   Intact: light touch    Right   Intact: light touch  Paresthesia: light touch    Reflexes   Left   Patellar (L4): normal (2+)    Right   Patellar (L4): normal (2+)    Active Range of Motion     Lumbar   Flexion: 80 degrees with pain  Extension: 30 degrees   Left lateral flexion: WFL  Right lateral flexion: WFL    Strength/Myotome Testing     Left Hip   Planes of Motion   Flexion: 4+  External rotation: 4+  Internal rotation: 4+    Right Hip   Planes of Motion   Flexion:  4+  External rotation: 4+  Internal rotation: 4+    Left Knee   Flexion: 4+  Extension: 4+    Right Knee   Flexion: 4+  Extension: 4+    Left Ankle/Foot   Dorsiflexion: 4+    Right Ankle/Foot   Dorsiflexion: 4+    Tests       Thoracic   Positive slump.     Lumbar   Positive repeated flexion.     Left   Positive passive SLR.   Negative crossed SLR.     Right   Negative crossed SLR and passive SLR.     Ambulation     Comments   No significant gait deficits        See Exercise, Manual, and Modality Logs for complete treatment.       Functional Outcome Score: DULCE=44% disability        Assessment & Plan       Assessment  Impairments: abnormal or restricted ROM, activity intolerance, lacks appropriate home exercise program and pain with function   Functional limitations: lifting, sleeping, uncomfortable because of pain and sitting   Assessment details: Hodan Buchanan is a 29 y.o. year-old female referred to physical therapy for R radicular LBP. She presents with a evolving clinical presentation.  She has no relevant co morbidities. Personal factors include patient having to care for a two year old daughter which may affect her progress in the plan of care.  Signs and symptoms are consistent with physical therapy diagnosis of lumbar radiculopathy. Pt was educated on course of treatment, possible reasons for pain, activity modifications, and use of ice/heat PRN. Pt was given a copy of HEP. Patient is appropriate for skilled physical therapy in order to reduce pain and increase ease with daily mobility.   Prognosis: good    Goals  Plan Goals: Short Term Goals for completion in 30 days:   -Patient will report good compliance with initial HEP  -Patient will demonstrate good core stabilization strength with PPTs to help reduce strain on low back  -Patient will report increased sitting tolerance from 2-3 min to 10 min or greater to allow patient to chart at work    LTGs for completion within 90 days:  -Patient will report 50%  improvement in R LE numbness to indicate centralization of symptoms  -Patient will reduce perceived disability on Oswestry from 44% disability to <20% disability to improve quality of life  -Patient will increase lumbar flexion AROM to 90 degrees (or more) without pain to increase ease donning shoes/socks    Plan  Therapy options: will be seen for skilled therapy services  Planned modality interventions: cryotherapy, dry needling, iontophoresis, TENS, thermotherapy (hydrocollator packs), traction and electrical stimulation/Russian stimulation  Planned therapy interventions: postural training, stretching, therapeutic activities, gait training, home exercise program, balance/weight-bearing training, flexibility, strengthening, functional ROM exercises, neuromuscular re-education, abdominal trunk stabilization and spinal/joint mobilization  Other planned therapy interventions: Aquatic therapy  Frequency: 2x week (36 visits)  Plan details: Therapy for core stabilization, LE strength/stability, gait training, balance, and posture        Timed:  Manual Therapy:         mins  03522;  Therapeutic Exercise:    10     mins  30945;     Neuromuscular Dar:        mins  32503;    Therapeutic Activity:          mins  75773;     Gait Training:           mins  76379;     Ultrasound:          mins  17398;    Iontophoresis         mins 58718;  Self Care   10      mins 62446    Untimed:  Electrical Stimulation:         mins  97072 ( );  Traction:       mins  13359;   Dry Needling   (1-2 muscles)   _     mins 40877 (Self-pay)  Dry Needling (3-4 muscles)  _     mins 93832 (Self-pay)  Dry Needling Trial    _10     mins DRYNDLTRIAL  (No Charge)  Low Eval     15     Mins  59965  Mod Eval          Mins  46467  High Eval                            Mins  09164  Re- Eval                           Mins  82569    Timed Treatment:   20   mins   Total Treatment:     50   mins    PT SIGNATURE: Rosa Maria Weber PT     License Number: [place] PT  [id number]    Electronically signed by Rosa Maria Weber, PT, 09/11/24, 2:28 PM EDT    DATE TREATMENT INITIATED: 9/11/2024    Initial Certification  Certification Period: 12/10/2024  I certify that the therapy services are furnished while this patient is under my care.  The services outlined above are required by this patient, and will be reviewed every 90 days.     PHYSICIAN: Evan Jung MD   NPI: [id number]                                         DATE:     Please sign and return via fax to [phone] Thank you, [facility].

## 2024-09-13 ENCOUNTER — TREATMENT (OUTPATIENT)
Dept: PHYSICAL THERAPY | Facility: CLINIC | Age: 29
End: 2024-09-13
Payer: COMMERCIAL

## 2024-09-13 DIAGNOSIS — M54.16 RADICULOPATHY, LUMBAR REGION: ICD-10-CM

## 2024-09-13 DIAGNOSIS — M79.604 RIGHT LEG PAIN: Primary | ICD-10-CM

## 2024-09-13 NOTE — PROGRESS NOTES
Physical Therapy Daily Treatment Note    [facility]  [address] [place] [address]  [phone] (phone)  [phone] (fax)    Patient: Hodan Buchanan   : 1995  Diagnosis/ICD-10 Code:  Right leg pain [M79.604]  Referring practitioner: Evan Jung MD  Date of Initial Visit: Type: THERAPY  Noted: 2024  Today's Date: 2024  Patient seen for 2 sessions           Subjective   Patient reports she was completely pain free when she left on Wednesday. Some low back soreness has returned but isn't nearly as bad as it was. Numbness is completely gone.     Objective     See Exercise, Manual, and Modality Logs for complete treatment.     Assessment/Plan  Continued with dry needling since patient had a strong improvement in her pain level from initial trial. Added supine marches and hip abduction strengthening to promote increased core and hip stability.  Also completed trial of IFC with heat to help minimize muscle soreness and to increase circulation after dry needling treatment.         Timed:    Manual Therapy:         mins  32326;  Therapeutic Exercise:    12     mins  33524;     Neuromuscular Dar:        mins  97496;    Therapeutic Activity:     8     mins  44830;     Gait Training:           mins  32269;     Ultrasound:          mins  86619;    Electrical Stimulation:         mins  32733 ( );  Iontophoresis         mins 28134;  Aquatic Therapy         mins 18254;    Untimed:  Electrical Stimulation:   15      mins  26366 ( );  Traction:         mins  31852;   Dry Needling   (1-2 muscles)       mins  (Self-pay)  Dry Needling (3-4 muscles)   10     mins  (Self-pay)  Dry Needling Trial          mins DRYNDLTRIAL  (No Charge)    Timed Treatment:   20   mins   Total Treatment:     45   mins    Rosa Maria Weber PT  Physical Therapist    [place] License:[id number]

## 2024-09-16 ENCOUNTER — TREATMENT (OUTPATIENT)
Dept: PHYSICAL THERAPY | Facility: CLINIC | Age: 29
End: 2024-09-16
Payer: COMMERCIAL

## 2024-09-16 DIAGNOSIS — M79.604 RIGHT LEG PAIN: ICD-10-CM

## 2024-09-16 DIAGNOSIS — M54.16 RADICULOPATHY, LUMBAR REGION: Primary | ICD-10-CM

## 2024-09-16 PROCEDURE — 97530 THERAPEUTIC ACTIVITIES: CPT | Performed by: PHYSICAL THERAPIST

## 2024-09-16 PROCEDURE — 97110 THERAPEUTIC EXERCISES: CPT | Performed by: PHYSICAL THERAPIST

## 2024-09-16 PROCEDURE — 97014 ELECTRIC STIMULATION THERAPY: CPT | Performed by: PHYSICAL THERAPIST

## 2024-09-16 PROCEDURE — 20560 NDL INSJ W/O NJX 1 OR 2 MUSC: CPT | Performed by: PHYSICAL THERAPIST

## 2024-09-19 ENCOUNTER — ANESTHESIA EVENT (OUTPATIENT)
Dept: PAIN MEDICINE | Facility: HOSPITAL | Age: 29
End: 2024-09-19
Payer: COMMERCIAL

## 2024-09-19 ENCOUNTER — HOSPITAL ENCOUNTER (OUTPATIENT)
Dept: GENERAL RADIOLOGY | Facility: HOSPITAL | Age: 29
Discharge: HOME | End: 2024-09-19
Payer: COMMERCIAL

## 2024-09-19 ENCOUNTER — HOSPITAL ENCOUNTER (OUTPATIENT)
Dept: PAIN MEDICINE | Facility: HOSPITAL | Age: 29
Discharge: HOME | End: 2024-09-19
Payer: COMMERCIAL

## 2024-09-19 ENCOUNTER — ANESTHESIA (OUTPATIENT)
Dept: PAIN MEDICINE | Facility: HOSPITAL | Age: 29
End: 2024-09-19
Payer: COMMERCIAL

## 2024-09-19 VITALS
TEMPERATURE: 97.3 F | HEART RATE: 74 BPM | RESPIRATION RATE: 16 BRPM | OXYGEN SATURATION: 98 % | DIASTOLIC BLOOD PRESSURE: 89 MMHG | SYSTOLIC BLOOD PRESSURE: 142 MMHG

## 2024-09-19 DIAGNOSIS — R52 PAIN: ICD-10-CM

## 2024-09-19 DIAGNOSIS — M51.16 NEURITIS OR RADICULITIS DUE TO RUPTURE OF LUMBAR INTERVERTEBRAL DISC: Primary | ICD-10-CM

## 2024-09-19 PROCEDURE — 25010000002 METHYLPREDNISOLONE PER 80 MG: Performed by: ANESTHESIOLOGY

## 2024-09-19 PROCEDURE — 77003 FLUOROGUIDE FOR SPINE INJECT: CPT

## 2024-09-19 RX ORDER — FENTANYL CITRATE 50 UG/ML
50 INJECTION, SOLUTION INTRAMUSCULAR; INTRAVENOUS AS NEEDED
Status: DISCONTINUED | OUTPATIENT
Start: 2024-09-19 | End: 2024-09-20 | Stop reason: HOSPADM

## 2024-09-19 RX ORDER — METHYLPREDNISOLONE ACETATE 80 MG/ML
80 INJECTION, SUSPENSION INTRA-ARTICULAR; INTRALESIONAL; INTRAMUSCULAR; SOFT TISSUE ONCE
Status: COMPLETED | OUTPATIENT
Start: 2024-09-19 | End: 2024-09-19

## 2024-09-19 RX ORDER — IOPAMIDOL 408 MG/ML
3 INJECTION, SOLUTION INTRATHECAL
Status: DISCONTINUED | OUTPATIENT
Start: 2024-09-19 | End: 2024-09-20 | Stop reason: HOSPADM

## 2024-09-19 RX ORDER — MIDAZOLAM HYDROCHLORIDE 1 MG/ML
1 INJECTION INTRAMUSCULAR; INTRAVENOUS
Status: DISCONTINUED | OUTPATIENT
Start: 2024-09-19 | End: 2024-09-20 | Stop reason: HOSPADM

## 2024-09-19 RX ORDER — LIDOCAINE HYDROCHLORIDE 10 MG/ML
1 INJECTION, SOLUTION INFILTRATION; PERINEURAL ONCE
Status: DISCONTINUED | OUTPATIENT
Start: 2024-09-19 | End: 2024-09-20 | Stop reason: HOSPADM

## 2024-09-19 RX ADMIN — METHYLPREDNISOLONE ACETATE 80 MG: 80 INJECTION, SUSPENSION INTRA-ARTICULAR; INTRALESIONAL; INTRAMUSCULAR; SOFT TISSUE at 13:30

## 2024-09-20 ENCOUNTER — TELEPHONE (OUTPATIENT)
Dept: PHYSICAL THERAPY | Facility: OTHER | Age: 29
End: 2024-09-20
Payer: COMMERCIAL

## 2024-09-23 ENCOUNTER — TREATMENT (OUTPATIENT)
Dept: PHYSICAL THERAPY | Facility: CLINIC | Age: 29
End: 2024-09-23
Payer: COMMERCIAL

## 2024-09-23 DIAGNOSIS — M54.16 RADICULOPATHY, LUMBAR REGION: Primary | ICD-10-CM

## 2024-09-23 DIAGNOSIS — M79.604 RIGHT LEG PAIN: ICD-10-CM

## 2024-09-23 PROCEDURE — 97530 THERAPEUTIC ACTIVITIES: CPT | Performed by: PHYSICAL THERAPIST

## 2024-09-23 PROCEDURE — 97110 THERAPEUTIC EXERCISES: CPT | Performed by: PHYSICAL THERAPIST

## 2024-09-23 PROCEDURE — 97014 ELECTRIC STIMULATION THERAPY: CPT | Performed by: PHYSICAL THERAPIST

## 2024-09-23 PROCEDURE — 20560 NDL INSJ W/O NJX 1 OR 2 MUSC: CPT | Performed by: PHYSICAL THERAPIST

## 2024-09-24 ENCOUNTER — OFFICE VISIT (OUTPATIENT)
Dept: NEUROSURGERY | Facility: CLINIC | Age: 29
End: 2024-09-24
Payer: COMMERCIAL

## 2024-09-24 VITALS
HEIGHT: 64 IN | WEIGHT: 290 LBS | SYSTOLIC BLOOD PRESSURE: 130 MMHG | BODY MASS INDEX: 49.51 KG/M2 | HEART RATE: 92 BPM | TEMPERATURE: 96.6 F | OXYGEN SATURATION: 98 % | DIASTOLIC BLOOD PRESSURE: 86 MMHG

## 2024-09-24 DIAGNOSIS — M51.16 NEURITIS OR RADICULITIS DUE TO RUPTURE OF LUMBAR INTERVERTEBRAL DISC: Primary | ICD-10-CM

## 2024-09-24 PROCEDURE — 99213 OFFICE O/P EST LOW 20 MIN: CPT | Performed by: NEUROLOGICAL SURGERY

## 2024-09-27 ENCOUNTER — TREATMENT (OUTPATIENT)
Dept: PHYSICAL THERAPY | Facility: CLINIC | Age: 29
End: 2024-09-27
Payer: COMMERCIAL

## 2024-09-27 DIAGNOSIS — M54.16 RADICULOPATHY, LUMBAR REGION: Primary | ICD-10-CM

## 2024-09-27 DIAGNOSIS — M79.604 RIGHT LEG PAIN: ICD-10-CM

## 2024-09-30 ENCOUNTER — TREATMENT (OUTPATIENT)
Dept: PHYSICAL THERAPY | Facility: CLINIC | Age: 29
End: 2024-09-30
Payer: COMMERCIAL

## 2024-09-30 DIAGNOSIS — M54.16 RADICULOPATHY, LUMBAR REGION: Primary | ICD-10-CM

## 2024-09-30 DIAGNOSIS — M79.604 RIGHT LEG PAIN: ICD-10-CM

## 2024-09-30 PROCEDURE — 97110 THERAPEUTIC EXERCISES: CPT | Performed by: PHYSICAL THERAPIST

## 2024-09-30 PROCEDURE — 97014 ELECTRIC STIMULATION THERAPY: CPT | Performed by: PHYSICAL THERAPIST

## 2024-09-30 PROCEDURE — 97530 THERAPEUTIC ACTIVITIES: CPT | Performed by: PHYSICAL THERAPIST

## 2024-09-30 PROCEDURE — 20561 NDL INSJ W/O NJX 3+ MUSC: CPT | Performed by: PHYSICAL THERAPIST

## 2024-09-30 NOTE — PROGRESS NOTES
Physical Therapy Daily Treatment Note    [facility]  [address] [place] [address]  [phone] (phone)  [phone] (fax)    Patient: Hodan Buchanan   : 1995  Diagnosis/ICD-10 Code:  Radiculopathy, lumbar region [M54.16]  Referring practitioner: Evan Jung MD  Date of Initial Visit: Type: THERAPY  Noted: 2024  Today's Date: 2024  Patient seen for 6 sessions           Subjective   Patient reports her pain has been worse the past few days. It has woken her up at night and nerve pain is also a little more severe.    Objective     See Exercise, Manual, and Modality Logs for complete treatment.     Assessment/Plan  Patient was agreeable to more dry needling. Only concern is that her R piriformis area has a lot of bruising at the moment (however that appears to be the location that she is getting the most relief from the needling). She was able to report an almost instant improvement in LE numbness after the treatment. Attempted to go around bruising as much as possible.          Timed:    Manual Therapy:         mins  59098;  Therapeutic Exercise:    12     mins  47834;     Neuromuscular Dar:        mins  48965;    Therapeutic Activity:     8     mins  72968;     Gait Training:           mins  64209;     Ultrasound:          mins  01019;    Electrical Stimulation:         mins  10954 ( );  Iontophoresis         mins 97038;  Aquatic Therapy         mins 61926;    Untimed:  Electrical Stimulation:   15      mins  17957 ( );  Traction:         mins  77906;   Dry Needling   (1-2 muscles)       mins  (Self-pay)  Dry Needling (3-4 muscles)   10     mins  (Self-pay)  Dry Needling Trial          mins DRYNDLTRIAL  (No Charge)    Timed Treatment:   20   mins   Total Treatment:     50   mins    Rosa Maria Weber PT  Physical Therapist    [place] License:[id number]

## 2024-10-04 ENCOUNTER — TREATMENT (OUTPATIENT)
Dept: PHYSICAL THERAPY | Facility: CLINIC | Age: 29
End: 2024-10-04
Payer: COMMERCIAL

## 2024-10-04 DIAGNOSIS — M54.16 RADICULOPATHY, LUMBAR REGION: Primary | ICD-10-CM

## 2024-10-04 DIAGNOSIS — M79.604 RIGHT LEG PAIN: ICD-10-CM

## 2024-10-04 NOTE — PROGRESS NOTES
Physical Therapy Daily Treatment Note      Patient: Hodan Buchanan   : 1995  Referring practitioner: Evan Jung MD  Date of Initial Visit: Type: THERAPY  Noted: 2024  Today's Date: 10/4/2024  Patient seen for 7 sessions         Hodan Buchanan reports: nerve pain is better since dry needling on Monday              Objective   See Exercise, Manual, and Modality Logs for complete treatment.       Assessment/Plan  Continued dry needling today to R posterior hip and added manual tx today with use of massage gun. Pt reports relief after today's session; advised to take her massage gun on her trip. Pt will be out of town for 2 weeks. Moderate tightness with Itband and piriformis stretches today but improved pain by end of session. IFC and heat post tx for further muscle relaxation and pain control.        Progress per Plan of Care and Progress strengthening /stabilization /functional activity           Timed:  Manual Therapy:    10     mins  89671;  Therapeutic Exercise:    20     mins  26167;     Neuromuscular Dar:    -    mins  21235;    Therapeutic Activity:     10     mins  81745;     Gait Training:      -     mins  21870;     Ultrasound:     -     mins  06445;      Untimed:  Electrical Stimulation:    15     mins  91726 ( );  Mechanical Traction:    -     mins  01828;   Dry needling:      -     mins  /    Timed Treatment:   40   mins   Total Treatment:     60   mins  Greta Watkins PT  Physical Therapist    License #: [id number]

## 2024-10-23 ENCOUNTER — TELEPHONE (OUTPATIENT)
Dept: PHYSICAL THERAPY | Facility: OTHER | Age: 29
End: 2024-10-23
Payer: COMMERCIAL

## 2024-10-23 NOTE — TELEPHONE ENCOUNTER
Caller: Hodan Buchanan    Relationship: Self    What was the call regarding: PATIENT CANCELLED TODAYS APPT BECAUSE HER CHILD IS SICK        Rescheduled appointment

## 2024-12-17 LAB
EXTERNAL HEPATITIS B SURFACE ANTIGEN: NEGATIVE
EXTERNAL HEPATITIS C AB: NEGATIVE
EXTERNAL RUBELLA QUALITATIVE: NORMAL
EXTERNAL SYPHILIS RPR SCREEN: NORMAL
HIV1 P24 AG SERPL QL IA: NORMAL

## 2025-03-21 ENCOUNTER — TREATMENT (OUTPATIENT)
Dept: PHYSICAL THERAPY | Facility: CLINIC | Age: 30
End: 2025-03-21
Payer: COMMERCIAL

## 2025-03-21 DIAGNOSIS — M54.9 PREGNANCY RELATED BACK PAIN IN SECOND TRIMESTER, ANTEPARTUM: ICD-10-CM

## 2025-03-21 DIAGNOSIS — O26.892 PREGNANCY RELATED BACK PAIN IN SECOND TRIMESTER, ANTEPARTUM: ICD-10-CM

## 2025-03-21 DIAGNOSIS — M54.50 ACUTE RIGHT-SIDED LOW BACK PAIN WITHOUT SCIATICA: Primary | ICD-10-CM

## 2025-03-21 NOTE — PROGRESS NOTES
"  Physical Therapy Initial Evaluation and Plan of Care    [facility]  [address] [place] [address]  [phone] (phone)  [phone] (fax)    Patient: Hodan Buchanan   : 1995  Diagnosis/ICD-10 Code:  Radiculopathy, lumbar region [M54.16]  Referring practitioner: Self Referring  Date of Initial Visit: 3/21/2025  Today's Date:  3/21/2025  Patient seen for 1 sessions           Past Medical History:   Diagnosis Date    Dysmenorrhea     Endometriosis     Gestational HTN     Low back pain     Peripheral neuropathy     PONV (postoperative nausea and vomiting)     UTI (urinary tract infection)     FREQUENT UTI'S        Past Surgical History:   Procedure Laterality Date    DIAGNOSTIC LAPAROSCOPY N/A 2019    Procedure: DIAGNOSTIC LAPAROSCOPY, cautery of endometriosis;  Surgeon: Rossi Salazar MD;  Location: [facility] OR [facility];  Service: Obstetrics/Gynecology    EPIDURAL BLOCK      WISDOM TOOTH EXTRACTION          Subjective Evaluation    History of Present Illness  Mechanism of injury: Pt reports w/ lower back pain of her R side which began one month ago. The pain will spasm into her R gluteal muscle usually but can sometimes radiate higher into her R erector spinae musculature. The pain has gotten progressively worse and is most severe when standing after long periods of sitting. Pt has a sitting tolerance of 30-45 minutes. Pt reports that the pain will cause an initial \"hitch\" in her R leg when ambulating after periods of rest. The abnormal gait pattern subsides as the pt continues to ambulate.     Pt has an active pregnancy at this time and reports that the pain is very similar to her first pregnancy. She also had PT during her first pregnancy and reports that it did not reduce her pain. The pain is not as severe as the last time d/t having a less intense job.       Patient Occupation: Nurse Practioner Quality of life: good    Pain  Current pain ratin  At worst pain rating: " "7  Location: R lumbar area  Quality: spasm.  Alleviating factors: walking.  Aggravating factors: ambulation, standing, movement and sleeping    Diagnostic Tests  MRI studies: abnormal (disc herniation L5-S1)    Treatments  No previous or current treatments  Patient Goals  Patient goals for therapy: increased strength, increased motion, improved balance and decreased pain  Patient goal: \"Get some relief\"           Objective          Palpation     Right Tenderness of the gluteus daniel, gluteus medius and piriformis.   Trigger point to gluteus daniel.     Tenderness     Left Hip   Tenderness in the PSIS.     Right Hip   Tenderness in the PSIS.     Active Range of Motion     Lumbar   Flexion: 45 (pain standing back up) degrees with pain  Extension: 3 degrees     Strength/Myotome Testing     Left Hip   Planes of Motion   Flexion: 5    Right Hip   Planes of Motion   Flexion: 5    Left Knee   Flexion: 5  Extension: 5    Right Knee   Flexion: 5  Extension: 5    Tests     Additional Tests Details  Not indicated w/ pregnancy    Ambulation     Comments   No gait deficits walking into PT  After prolonged sitting: moderate R Le antalgia with decreased step length     General Comments     Hip Comments   L PIIN       See Exercise, Manual, and Modality Logs for complete treatment.       Functional Outcome Score: back index 22/50 or 44%         Assessment & Plan       Assessment  Impairments: abnormal gait, abnormal muscle firing, abnormal muscle tone, abnormal or restricted ROM, activity intolerance, impaired balance, impaired physical strength, lacks appropriate home exercise program and pain with function   Functional limitations: lifting, sleeping, walking, uncomfortable because of pain, moving in bed, sitting, standing and reaching behind back   Assessment details: Hodan Buchanan is a 29 y.o. year-old female referred to physical therapy for lower back pain. She presents with a stable clinical presentation.  She has " comorbidities such as active pregnancy and personal factors such as having to be seated for long periods of time that may affect her progress in the plan of care.  Signs and symptoms are consistent with physical therapy diagnosis of lower back pain. Patient is appropriate for skilled physical therapy in order to reduce pain and increase ease with daily mobility.    Prognosis: fair    Goals  Plan Goals: Goals  Plan Goals: STGs to be completed within 30 days:  -Patient will demonstrate compliance and independence with initial HEP  -Patient will report improvement in seated from 30 min to 45 min or more to allow patient to complete long-distance ambulation at work with minimal pain  -Patient will improve score on DULCE from 44% disability at eval to 35% or better to improve quality of life.        LTGs to be completed within 90 days:  -Patient will report 50% reduction in pain after sitting >40 min to allow patient to complete work related activities such as charting  -Patient will improve score on DULCE from 44% disability at eval to 20% or better to improve quality of life.  -Patient will be able to complete STS w/o increased pain or abnormal gait mechanics to increase ability w/ completing daily work activities.       Plan  Therapy options: will be seen for skilled therapy services  Planned modality interventions: cryotherapy and thermotherapy (hydrocollator packs)  Planned therapy interventions: ADL retraining, balance/weight-bearing training, manual therapy, postural training, soft tissue mobilization, strengthening, stretching, therapeutic activities, transfer training, gait training, functional ROM exercises, home exercise program, flexibility and abdominal trunk stabilization  Frequency: 1x week (36)  Treatment plan discussed with: patient  Plan details: Core strength, LE AROM and lumbar mobility, gait training, balance        Timed:  Manual Therapy:         mins  73276;  Therapeutic Exercise:    12     mins   67306;     Neuromuscular Dar:    8    mins  78752;    Therapeutic Activity:     10     mins  42624;     Gait Training:           mins  89485;     Ultrasound:          mins  26763;    Iontophoresis         mins 48307;  Self Care         mins 26207    Untimed:  Electrical Stimulation:         mins  40317 ( );  Traction:       mins  94403;   Dry Needling   (1-2 muscles)   _     mins 52749 (Self-pay)  Dry Needling (3-4 muscles)  _     mins 29372 (Self-pay)  Dry Needling Trial    _     mins DRYNDLTRIAL  (No Charge)  Low Eval     15     Mins  32186  Mod Eval          Mins  08557  High Eval                            Mins  97716  Re- Eval                           Mins  11857    Timed Treatment:   30   mins   Total Treatment:     45   mins    PT SIGNATURE: Rosa Maria Weber PT     License Number: [place] [id number]    Electronically signed by Noah Redding, PT Student, 03/21/25, 1:05 PM EDT    DATE TREATMENT INITIATED: 3/21/2025    Initial Certification  Certification Period: 6/19/2025  I certify that the therapy services are furnished while this patient is under my care.  The services outlined above are required by this patient, and will be reviewed every 90 days.     PHYSICIAN: Referring, Self   NPI:                                          DATE:     Please sign and return via fax to [phone] Thank you, [facility] Therapy.

## 2025-03-28 ENCOUNTER — TREATMENT (OUTPATIENT)
Dept: PHYSICAL THERAPY | Facility: CLINIC | Age: 30
End: 2025-03-28
Payer: COMMERCIAL

## 2025-03-28 DIAGNOSIS — M54.9 PREGNANCY RELATED BACK PAIN IN SECOND TRIMESTER, ANTEPARTUM: ICD-10-CM

## 2025-03-28 DIAGNOSIS — O26.892 PREGNANCY RELATED BACK PAIN IN SECOND TRIMESTER, ANTEPARTUM: ICD-10-CM

## 2025-03-28 DIAGNOSIS — M54.50 ACUTE RIGHT-SIDED LOW BACK PAIN WITHOUT SCIATICA: Primary | ICD-10-CM

## 2025-03-28 NOTE — PROGRESS NOTES
Physical Therapy Daily Treatment Note    [facility]  [address] [place] [address]  [phone] (phone)  [phone] (fax)    Patient: Hodan Buchanan   : 1995  Diagnosis/ICD-10 Code:  Acute right-sided low back pain without sciatica [M54.50]  Referring practitioner: Evan Jung MD  Date of Initial Visit: Type: THERAPY  Noted: 3/21/2025  Today's Date:  3/28/2025  Patient seen for 2 sessions           Subjective   Feeling better overall. Still having similar symptoms but less severe.  Spasms have also been less frequent. Waiting on a yoga ball to come in but trying to avoid prolonged periods of sitting at work. Sleeping better too.     Objective     See Exercise, Manual, and Modality Logs for complete treatment.     Assessment/Plan  Progressed core strengthening exercises and had patient pay attention to breathing in order to help maintain core engagement. Some mild dizziness noted after supine exercises therefore transitioned back to seated position for remainder of treatment session. Added lateral leans to ball roll outs to help target QL muscles. Also progressed hold time for planks. Patient continues to be appropriate for skilled PT to address LBP and increase core strength.          Timed:    Manual Therapy:         mins  88477;  Therapeutic Exercise:    12     mins  22010;     Neuromuscular Dar:   8     mins  04532;    Therapeutic Activity:     10     mins  80407;     Gait Training:           mins  98223;     Ultrasound:          mins  61311;    Electrical Stimulation:         mins  83332 ( );  Iontophoresis         mins 07571;  Aquatic Therapy         mins 42135;    Untimed:  Electrical Stimulation:         mins  03474 ( );  Traction:         mins  59566;   Dry Needling   (1-2 muscles)       mins  (Self-pay)  Dry Needling (3-4 muscles)        mins  (Self-pay)  Dry Needling Trial          mins DRYNDLTRIAL  (No Charge)    Timed Treatment:   32    mins   Total Treatment:     42   mins    Rosa Maria Weber PT  Physical Therapist    [place] License:[id number]

## 2025-04-02 ENCOUNTER — TREATMENT (OUTPATIENT)
Dept: PHYSICAL THERAPY | Facility: CLINIC | Age: 30
End: 2025-04-02
Payer: COMMERCIAL

## 2025-04-02 DIAGNOSIS — M54.9 PREGNANCY RELATED BACK PAIN IN SECOND TRIMESTER, ANTEPARTUM: Primary | ICD-10-CM

## 2025-04-02 DIAGNOSIS — M54.50 ACUTE RIGHT-SIDED LOW BACK PAIN WITHOUT SCIATICA: ICD-10-CM

## 2025-04-02 DIAGNOSIS — O26.892 PREGNANCY RELATED BACK PAIN IN SECOND TRIMESTER, ANTEPARTUM: Primary | ICD-10-CM

## 2025-04-02 NOTE — PROGRESS NOTES
"Physical Therapy Daily Treatment Note    [facility]  [address] [place] [address]  [phone] (phone)  [phone] (fax)    Patient: Hodan Buchanan   : 1995  Diagnosis/ICD-10 Code:  Pregnancy related back pain in second trimester, antepartum [O26.892, M54.9]  Referring practitioner: Evan Jung MD  Date of Initial Visit: Type: THERAPY  Noted: 3/21/2025  Today's Date:  2025  Patient seen for 3 sessions           Subjective   Back is feeling better overall. Felt more \"off\" after the last treatment but not sure if it was related or not.     Objective     See Exercise, Manual, and Modality Logs for complete treatment.     Assessment/Plan  Performed all exercises in sitting or with wedge behind back to prevent patient from having to lay supine. Continued to emphasize core strengthening and low back flexibility. Severity of symptoms appears to be improving and pain is less sharp in nature. Encouraged patient to continue exercises at home in modified positions to prevent excessive bouts in supine. Ended with heat to minimize muscle soreness and pain.          Timed:    Manual Therapy:         mins  96656;  Therapeutic Exercise:    24     mins  66740;     Neuromuscular Dar:        mins  27430;    Therapeutic Activity:     8     mins  18107;     Gait Training:           mins  58896;     Ultrasound:          mins  18905;    Electrical Stimulation:         mins  54195 ( );  Iontophoresis         mins 70827;  Aquatic Therapy         mins 88194;    Untimed:  Electrical Stimulation:         mins  62549 ( );  Traction:         mins  90115;   Dry Needling   (1-2 muscles)       mins  (Self-pay)  Dry Needling (3-4 muscles)        mins  (Self-pay)  Dry Needling Trial          mins DRYNDLTRIAL  (No Charge)    Timed Treatment:   32   mins   Total Treatment:     42   mins    Rosa Maria Weber PT  Physical Therapist    [place] License:[id number]  "

## 2025-04-03 ENCOUNTER — TRANSCRIBE ORDERS (OUTPATIENT)
Dept: ULTRASOUND IMAGING | Facility: HOSPITAL | Age: 30
End: 2025-04-03
Payer: COMMERCIAL

## 2025-04-03 DIAGNOSIS — O28.3 ABNORMAL FETAL ULTRASOUND: Primary | ICD-10-CM

## 2025-04-10 ENCOUNTER — OFFICE VISIT (OUTPATIENT)
Dept: OBSTETRICS AND GYNECOLOGY | Facility: CLINIC | Age: 30
End: 2025-04-10
Payer: COMMERCIAL

## 2025-04-10 ENCOUNTER — HOSPITAL ENCOUNTER (OUTPATIENT)
Dept: ULTRASOUND IMAGING | Facility: HOSPITAL | Age: 30
Discharge: HOME | End: 2025-04-10
Admitting: OBSTETRICS & GYNECOLOGY
Payer: COMMERCIAL

## 2025-04-10 VITALS
SYSTOLIC BLOOD PRESSURE: 121 MMHG | DIASTOLIC BLOOD PRESSURE: 67 MMHG | HEART RATE: 91 BPM | HEIGHT: 64 IN | BODY MASS INDEX: 50.02 KG/M2 | WEIGHT: 293 LBS | TEMPERATURE: 97.8 F | OXYGEN SATURATION: 99 %

## 2025-04-10 DIAGNOSIS — O28.3 ABNORMAL FETAL ULTRASOUND: ICD-10-CM

## 2025-04-10 DIAGNOSIS — Z3A.24 24 WEEKS GESTATION OF PREGNANCY: ICD-10-CM

## 2025-04-10 DIAGNOSIS — O35.AXX0 PREGNANCY COMPLICATED BY FETAL CLEFT LIP, SINGLE OR UNSPECIFIED FETUS: ICD-10-CM

## 2025-04-10 DIAGNOSIS — Z34.90 PREGNANCY, UNSPECIFIED GESTATIONAL AGE: Primary | ICD-10-CM

## 2025-04-10 DIAGNOSIS — O35.AXX0 FETAL CLEFT LIP AND PALATE AFFECTING ANTEPARTUM CARE OF MOTHER, SINGLE OR UNSPECIFIED FETUS: Primary | ICD-10-CM

## 2025-04-10 PROCEDURE — 76817 TRANSVAGINAL US OBSTETRIC: CPT

## 2025-04-10 PROCEDURE — 76811 OB US DETAILED SNGL FETUS: CPT

## 2025-04-10 NOTE — PROGRESS NOTES
MATERNAL FETAL MEDICINE Consult Note    Dear Dr Beth Rose MD:    Thank you for your kind referral of Hodan Buchanan.  As you know, she is a 29 y.o.   24w2d gestation (Estimated Date of Delivery: 25). This is a consult.      Her antepartum course is complicated by:  Placenta previa --resolved  Unilateral right cleft lip, palate suboptimal     Aneuploidy Screening: Per patient report low risk panorama    HPI: No contractions, LOF, vaginal bleeding. Normal fetal movement.   She denies headache, vision changes, shortness of breath, acute changes in edema, and RUQ pain.       Review of History:  Past Medical History:   Diagnosis Date    Dysmenorrhea     Endometriosis     Gestational HTN     Labetalol 6 weeks PP    Low back pain     Peripheral neuropathy     PONV (postoperative nausea and vomiting)     Postpartum hemorrhage     UTI (urinary tract infection)     FREQUENT UTI'S     Past Surgical History:   Procedure Laterality Date    DIAGNOSTIC LAPAROSCOPY N/A 2019    Procedure: DIAGNOSTIC LAPAROSCOPY, cautery of endometriosis;  Surgeon: Rossi Salazar MD;  Location: [facility] OR [facility];  Service: Obstetrics/Gynecology    EPIDURAL BLOCK      WISDOM TOOTH EXTRACTION         OB Hx:  OB History       2    Para   1    Term   1            AB        Living   1      SAB        IAB        Ectopic        Molar        Multiple   0    Live Births   1         # Outcome Date GA Labor/2nd Weight Sex Type Anes PTL Lv A1 A5   2 Current                 1 Term 22 38w3d 10h 06m / 1h 44m 3240 g (7 lb 2.3 oz) F Vag-Spont Epidural N Living 8 9   Name: ALEM BUCHANAN   Complications: Postpartum Hemorrhage   Birth Comments: Abraham LDR 6   Location: [facility] ([facility] LABOR DELIVERY)   Delivering Clinician: Rossi Salazar MD          Social History     Socioeconomic History    Marital status:    Tobacco Use    Smoking status: Never     Passive exposure: Never    Smokeless tobacco: Never   Vaping  "Use    Vaping status: Never Used   Substance and Sexual Activity    Alcohol use: Not Currently     Comment: SOCIALLY    Drug use: No    Sexual activity: Defer     Family History   Problem Relation Age of Onset    Malig Hyperthermia Neg Hx       No Known Allergies   Current Outpatient Medications on File Prior to Visit   Medication Sig Dispense Refill    aspirin 81 MG oral suspension       Prenatal Vit-Fe Fumarate-FA (prenatal vitamin 27-0.8) 27-0.8 MG tablet tablet Take 1 tablet by mouth Daily.      buPROPion XL (Wellbutrin XL) 300 MG 24 hr tablet Take 1 tablet by mouth Daily. 90 tablet 1    cephalexin (KEFLEX) 500 MG capsule Take 1 capsule by mouth Every 12 (Twelve) Hours. 14 capsule 0    dexAMETHasone (DECADRON) 1.5 MG tablet Take 3 tablets by mouth twice daily x 4 days, THEN 3 tabs in the morning and 2 in the evening x 1 day, THEN 2 tabs twice daily x 3 days, THEN 2 tabs in the morning and 1 in the evening for 1 day, THEN 1 tab twice daily for 3 days, THEN 1 every morning until gone. 51 tablet 0    hydrOXYzine (ATARAX) 25 MG tablet Take 1 tablet by mouth Every 8 (Eight) Hours As Needed for Anxiety. 30 tablet 0    spironolactone (ALDACTONE) 50 MG tablet Take 0.5 tablets by mouth Daily. 30 tablet 0     No current facility-administered medications on file prior to visit.        Past obstetric, gynecological, medical, surgical, family and social history reviewed.  Relevant lab work and imaging reviewed.    Review of systems  As above in HPI     Vitals:    04/10/25 0744   BP: 121/67   BP Location: Right arm   Patient Position: Sitting   Pulse: 91   Temp: 97.8 °F (36.6 °C)   TempSrc: Temporal   SpO2: 99%   Weight: 135 kg (297 lb 12.8 oz)   Height: 162.6 cm (64\")       PHYSICAL EXAM   General: No acute distress  Respiratory: No increased work of breathing  Cardiac: reg rate   Abdominal: Gravid, nontender  Extremities: No significant edema  Neuro/psych: Alert and oriented, appropriate mood      ULTRASOUND   Please view " full ultrasound note on Imaging tab in ViewPoint.  Cephalic presentation   Posterior placenta measuring 2.07 cm away from the internal os  Transvaginal cervical length 4.3 cm which is normal  Fetal biometry is consistent with dates EFW 54%, AC 69%  SUKH 11.5 cm which is normal  A unilateral right cleft lip is noted; the palate was suboptimally evaluated   The fetal anatomic survey was unable to be successfully obtained.  Suboptimal images listed above.    Counseled regarding the limitations of US.     ASSESSMENT/COUNSELING:    Diagnoses and all orders for this visit:    1. Fetal cleft lip and palate affecting antepartum care of mother, single or unspecified fetus (Primary)  -     Ambulatory Referral to Oral Maxillofacial Surgery  -     Ambulatory Referral to Genetic Counseling/Testing    2. 24 weeks gestation of pregnancy         Cleft Lip/Palate, unilateral vs bilateral.       Today a comprehensive anatomy ultrasound was performed which identified a suspected unilateral left-sided cleft lip and palate.  Fetal growth is appropriate.  No other anomalies or soft markers for aneuploidy were seen. She has had a low risk NIPS.     I discussed with the patient and her  the above findings. Cleft lip is a common abnormality which occurs in approximately 1 in 1000 pregnancies, but higher frequency when there is a family history of the same.   Most clefts are unilateral and occur on the left. Fetuses with cleft lip have a high incidence of associated anomalies, mostly spine and cardiac--Up to 13% will have associated abnormalities structurally (up to 25% with bilateral clefting). Up to 10% may also have underlying chromosomal abnormality, but this is less likely with an isolated cleft. More than 400 genetic syndromes are associated with facial clefting. Some of the more common include Trisomy 13 and 18, Treacher Block, Alfredo Felix, Goldenhar, DiGeorge, Crouzon, and Waardenburg. We did not review the specific genetic  syndromes today, but they are interested in meeting with genetic counseling. We briefly discussed the option of amniocentesis today.     Polyhydramnios may occur if there is defective swallowing. With isolated cleft lip and palate, fetal growth is typically normal. With isolated cleft lip the  and most do not have underlying fetal syndrome or chromosome abnormality.      Obstetric management is typically unchanged and delivery should be by the usual obstetric indications. Neonates should be examined after birth, there is a change of  anomaly not seen by ultrasound. However, in the absence of confirmed associated anomalies prenatally, it is appropriate for babies to be delivered at [facility].     I discussed an comprehensive prenatal genetic testing with amniocentesis which entails a 1 in 900 risk of complications including rupture of membranes,  labor and pregnancy loss. It is reasonable to do this, but in the setting of an isolated cleft palate, it most likely will be normal.  They plan to discuss further with genetic counseling prior to making a decision       I discussed  repair and that at birth babies can have usually minor respiratory issues and can have feeding issues related to the cleft lip/palate.  Repair is usually done around 6 months of life and I relayed that repairs were excellent and after repair babies usually did well and had no long term consequences.  We have referred them for counseling with OMFS to discuss more about  repair.       I would recommend serial growth exams and evaluation of SUKH to look for polyhydramnios as this is a common finding with cleft lip/palate.  Follow up in 4 weeks to better assess the fetal palate and complete the anatomy.      History of gestational hypertension   Is compliant with low-dose aspirin  Recommend completion of baseline CMP, CBC, urine protein assessment  I discussed the increased rate of recurrence in a subsequent pregnancy.   She delivered at term therefore no additional monitoring or workup is necessary.    Summary of Plan  -Completion of anatomy with MFM in 4 weeks  -Referral to OMFS   -Referral to Genetic counseling   -Serial growth ultrasounds every 4  weeks (by primary OB)   -Starting at 28 - 32 weeks: Weekly fetal  surveillance until delivery   -Starting at 28 weeks: Fetal movement instructions given continue daily until delivery; instructed to report to labor and delivery if cannot achieve more than 10 kicks in one hour or if she perceives a decrease in fetal movement    Follow-up: 4 WEEKS     Thank you for the consult and opportunity to care for this patient.  Please feel free to reach out with any questions or concerns.    I spent 45 minutes caring for this patient on this date of service. This time includes time spent by me in the following activities: preparing for the visit, reviewing tests, obtaining and/or reviewing a separately obtained history, performing a medically appropriate examination and/or evaluation, counseling and educating the patient/family/caregiver and independently interpreting results and communicating that information with the patient/family/caregiver with greater than 50% spent in counseling and coordination of care.         I spent 8 minutes on the separately reported service of US imaging not included in the time used to support the E/M service also reported today.      Randi Fernandez MD   Maternal Fetal Medicine-[facility]  Office: [phone]

## 2025-04-10 NOTE — LETTER
April 10, 2025     Beth Rose MD  [address]  [address]  [place] [address]    Patient: Hodan Buchanan   YOB: 1995   Date of Visit: 4/10/2025       Dear Beth Rose MD,    Thank you for referring Hodan Buchanan to me for evaluation. Below is a copy of my consult note.    If you have questions, please do not hesitate to call me. I look forward to following Hodan along with you.         Sincerely,        Randi Fernandez MD        CC: No Recipients    Pt reports that she is doing well and denies vaginal bleeding, cramping, contractions or LOF at this time. Reports active fetal movement. Reviewed when to call OB office or present to L&D for evaluation with symptoms such as decreased fetal movement, vaginal bleeding, LOF or ctxs. Pt verbalized understanding. Denies HA, visual changes or epigastric pain. Denies any additional complaints at time of appointment. Next OB appointment scheduled for .    Vitals:    04/10/25 0744   BP: 121/67   Pulse: 91   Temp: 97.8 °F (36.6 °C)   SpO2: 99%          MATERNAL FETAL MEDICINE Consult Note    Dear Dr Beth Rose MD:    Thank you for your kind referral of Hodan Buchanan.  As you know, she is a 29 y.o.   24w2d gestation (Estimated Date of Delivery: 25). This is a consult.      Her antepartum course is complicated by:  Placenta previa --resolved  Unilateral right cleft lip, palate suboptimal     Aneuploidy Screening: Per patient report low risk panorama    HPI: No contractions, LOF, vaginal bleeding. Normal fetal movement.   She denies headache, vision changes, shortness of breath, acute changes in edema, and RUQ pain.       Review of History:  Past Medical History:   Diagnosis Date   • Dysmenorrhea    • Endometriosis    • Gestational HTN     Labetalol 6 weeks PP   • Low back pain    • Peripheral neuropathy    • PONV (postoperative nausea and vomiting)    • Postpartum hemorrhage    • UTI (urinary tract infection)     FREQUENT UTI'S      Past Surgical History:   Procedure Laterality Date   • DIAGNOSTIC LAPAROSCOPY N/A 2019    Procedure: DIAGNOSTIC LAPAROSCOPY, cautery of endometriosis;  Surgeon: Rossi Salazar MD;  Location: [facility] OR [facility];  Service: Obstetrics/Gynecology   • EPIDURAL BLOCK     • WISDOM TOOTH EXTRACTION         OB Hx:  OB History       2    Para   1    Term   1            AB        Living   1      SAB        IAB        Ectopic        Molar        Multiple   0    Live Births   1         # Outcome Date GA Labor/2nd Weight Sex Type Anes PTL Lv A1 A5   2 Current                 1 Term 22 38w3d 10h 06m / 1h 44m 3240 g (7 lb 2.3 oz) F Vag-Spont Epidural N Living 8 9   Name: ALEM CASTANO   Complications: Postpartum Hemorrhage   Birth Comments: Abraham LDR 6   Location: [facility] ([facility] LABOR DELIVERY)   Delivering Clinician: Rossi Salazar MD          Social History     Socioeconomic History   • Marital status:    Tobacco Use   • Smoking status: Never     Passive exposure: Never   • Smokeless tobacco: Never   Vaping Use   • Vaping status: Never Used   Substance and Sexual Activity   • Alcohol use: Not Currently     Comment: SOCIALLY   • Drug use: No   • Sexual activity: Defer     Family History   Problem Relation Age of Onset   • Malig Hyperthermia Neg Hx       No Known Allergies   Current Outpatient Medications on File Prior to Visit   Medication Sig Dispense Refill   • aspirin 81 MG oral suspension      • Prenatal Vit-Fe Fumarate-FA (prenatal vitamin 27-0.8) 27-0.8 MG tablet tablet Take 1 tablet by mouth Daily.     • buPROPion XL (Wellbutrin XL) 300 MG 24 hr tablet Take 1 tablet by mouth Daily. 90 tablet 1   • cephalexin (KEFLEX) 500 MG capsule Take 1 capsule by mouth Every 12 (Twelve) Hours. 14 capsule 0   • dexAMETHasone (DECADRON) 1.5 MG tablet Take 3 tablets by mouth twice daily x 4 days, THEN 3 tabs in the morning and 2 in the evening x 1 day, THEN 2 tabs twice daily x 3 days, THEN 2 tabs  "in the morning and 1 in the evening for 1 day, THEN 1 tab twice daily for 3 days, THEN 1 every morning until gone. 51 tablet 0   • hydrOXYzine (ATARAX) 25 MG tablet Take 1 tablet by mouth Every 8 (Eight) Hours As Needed for Anxiety. 30 tablet 0   • spironolactone (ALDACTONE) 50 MG tablet Take 0.5 tablets by mouth Daily. 30 tablet 0     No current facility-administered medications on file prior to visit.        Past obstetric, gynecological, medical, surgical, family and social history reviewed.  Relevant lab work and imaging reviewed.    Review of systems  As above in HPI     Vitals:    04/10/25 0744   BP: 121/67   BP Location: Right arm   Patient Position: Sitting   Pulse: 91   Temp: 97.8 °F (36.6 °C)   TempSrc: Temporal   SpO2: 99%   Weight: 135 kg (297 lb 12.8 oz)   Height: 162.6 cm (64\")       PHYSICAL EXAM   General: No acute distress  Respiratory: No increased work of breathing  Cardiac: reg rate   Abdominal: Gravid, nontender  Extremities: No significant edema  Neuro/psych: Alert and oriented, appropriate mood      ULTRASOUND   Please view full ultrasound note on Imaging tab in ViewPoint.  Cephalic presentation   Posterior placenta measuring 2.07 cm away from the internal os  Transvaginal cervical length 4.3 cm which is normal  Fetal biometry is consistent with dates EFW 54%, AC 69%  SUKH 11.5 cm which is normal  A unilateral right cleft lip is noted; the palate was suboptimally evaluated   The fetal anatomic survey was unable to be successfully obtained.  Suboptimal images listed above.    Counseled regarding the limitations of US.     ASSESSMENT/COUNSELING:    Diagnoses and all orders for this visit:    1. Fetal cleft lip and palate affecting antepartum care of mother, single or unspecified fetus (Primary)  -     Ambulatory Referral to Oral Maxillofacial Surgery  -     Ambulatory Referral to Genetic Counseling/Testing    2. 24 weeks gestation of pregnancy         Cleft Lip/Palate, unilateral vs bilateral.  "      Today a comprehensive anatomy ultrasound was performed which identified a suspected unilateral left-sided cleft lip and palate.  Fetal growth is appropriate.  No other anomalies or soft markers for aneuploidy were seen. She has had a low risk NIPS.     I discussed with the patient and her  the above findings. Cleft lip is a common abnormality which occurs in approximately 1 in 1000 pregnancies, but higher frequency when there is a family history of the same.   Most clefts are unilateral and occur on the left. Fetuses with cleft lip have a high incidence of associated anomalies, mostly spine and cardiac--Up to 13% will have associated abnormalities structurally (up to 25% with bilateral clefting). Up to 10% may also have underlying chromosomal abnormality, but this is less likely with an isolated cleft. More than 400 genetic syndromes are associated with facial clefting. Some of the more common include Trisomy 13 and 18, Treacher Block, Alfredo Felix, Goldenhar, DiGeorge, Crouzon, and Waarden[place]. We did not review the specific genetic syndromes today, but they are interested in meeting with genetic counseling. We briefly discussed the option of amniocentesis today.     Polyhydramnios may occur if there is defective swallowing. With isolated cleft lip and palate, fetal growth is typically normal. With isolated cleft lip the  and most do not have underlying fetal syndrome or chromosome abnormality.      Obstetric management is typically unchanged and delivery should be by the usual obstetric indications. Neonates should be examined after birth, there is a change of  anomaly not seen by ultrasound. However, in the absence of confirmed associated anomalies prenatally, it is appropriate for babies to be [facility] at [facility].     I discussed an comprehensive prenatal genetic testing with amniocentesis which entails a 1 in 900 risk of complications including rupture of membranes,  labor and  pregnancy loss. It is reasonable to do this, but in the setting of an isolated cleft palate, it most likely will be normal.  They plan to discuss further with genetic counseling prior to making a decision       I discussed  repair and that at birth babies can have usually minor respiratory issues and can have feeding issues related to the cleft lip/palate.  Repair is usually done around 6 months of life and I relayed that repairs were excellent and after repair babies usually did well and had no long term consequences.  We have referred them for counseling with OMFS to discuss more about  repair.       I would recommend serial growth exams and evaluation of SUKH to look for polyhydramnios as this is a common finding with cleft lip/palate.  Follow up in 4 weeks to better assess the fetal palate and complete the anatomy.      History of gestational hypertension   Is compliant with low-dose aspirin  Recommend completion of baseline CMP, CBC, urine protein assessment  I discussed the increased rate of recurrence in a subsequent pregnancy.  She delivered at term therefore no additional monitoring or workup is necessary.    Summary of Plan  -Completion of anatomy with MFM in 4 weeks  -Referral to OMFS   -Referral to Genetic counseling   -Serial growth ultrasounds every 4  weeks (by primary OB)   -Starting at 28 - 32 weeks: Weekly fetal  surveillance until delivery   -Starting at 28 weeks: Fetal movement instructions given continue daily until delivery; instructed to report to labor and delivery if cannot achieve more than 10 kicks in one hour or if she perceives a decrease in fetal movement    Follow-up: 4 WEEKS     Thank you for the consult and opportunity to care for this patient.  Please feel free to reach out with any questions or concerns.    I spent 45 minutes caring for this patient on this date of service. This time includes time spent by me in the following activities: preparing for the  visit, reviewing tests, obtaining and/or reviewing a separately obtained history, performing a medically appropriate examination and/or evaluation, counseling and educating the patient/family/caregiver and independently interpreting results and communicating that information with the patient/family/caregiver with greater than 50% spent in counseling and coordination of care.         I spent 8 minutes on the separately reported service of US imaging not included in the time used to support the E/M service also reported today.      Randi Fernandez MD   Maternal Fetal Medicine-[facility]  Office: [phone]

## 2025-04-10 NOTE — PROGRESS NOTES
Pt reports that she is doing well and denies vaginal bleeding, cramping, contractions or LOF at this time. Reports active fetal movement. Reviewed when to call OB office or present to L&D for evaluation with symptoms such as decreased fetal movement, vaginal bleeding, LOF or ctxs. Pt verbalized understanding. Denies HA, visual changes or epigastric pain. Denies any additional complaints at time of appointment. Next OB appointment scheduled for 4/17.    Vitals:    04/10/25 0744   BP: 121/67   Pulse: 91   Temp: 97.8 °F (36.6 °C)   SpO2: 99%

## 2025-04-16 NOTE — PROGRESS NOTES
[facility]  Genetic Counseling Note    Patient Name:  Hodan Buchanan  :   1995  ABHAY:   2025  MRN:   [id number]  Patient's Age at LEXY: 30 years    Referring Provider: Randi Fernandez MD    Referring Diagnosis:  Hodan Buchanan is a 29 y.o. female who was accompanied by her , Nathan Buchanan ( 1994). Hodan is here in consultation for a prenatal diagnosis of fetal cleft lip (unilateral, right side) and possible cleft palate.     Pregnancy history:  Estimated Date of Delivery: 25            GA:25w3d    [place] pregnancy  Male fetus    OB History          2    Para   1    Term   1            AB        Living   1         SAB        IAB        Ectopic        Molar        Multiple   0    Live Births   1              Ms. Buchanan reports no use of fertility treatments, gamete donors, or assistive reproductive technologies to conceive this pregnancy.     Patient medical history:   Past Medical History:   Diagnosis Date    Dysmenorrhea     Endometriosis     Gestational HTN     Labetalol 6 weeks PP    Low back pain     Peripheral neuropathy     PONV (postoperative nausea and vomiting)     Postpartum hemorrhage     UTI (urinary tract infection)     FREQUENT UTI'S     Ms. Buchanan reports no history of diabetes or infertility.     Medications:   Current Outpatient Medications   Medication Sig Dispense Refill    aspirin 81 MG oral suspension       Prenatal Vit-Fe Fumarate-FA (prenatal vitamin 27-0.8) 27-0.8 MG tablet tablet Take 1 tablet by mouth Daily.       No current facility-administered medications for this visit.     Genetic/lab testing already completed during current pregnancy:  Cell free DNA screening / Non-invasive prenatal testing: low risk Panorama per patient report  Carrier Screening: not yet    Imaging:   Ultrasound  Date: 4/10/25, 24w2d gestation  Impression  Cephalic presentation  Posterior placenta measuring 2.07 cm away from the internal os  Transvaginal cervical length  4.3 cm which is normal  Fetal biometry is consistent with dates EFW 54%, AC 69%  SUKH 11.5 cm which is normal  A unilateral right cleft lip is noted; the palate was suboptimally evaluated  The fetal anatomic survey was unable to be successfully obtained. Suboptimal images listed above.    Psychosocial History:   Psychosocial assessment: Hodan was initially shocked when she learned the baby had cleft lip, and she is adjusting to the news. We discussed that there are many resources to help children with cleft lip/palate and their families. Hodan and Nathan would like to know more about possible genetic causes, and requested carrier screening and Vistara single gene NIPT. They declined amniocentesis at this time.    Family History:    A three-generation family history was obtained for the patient and the father of the baby. See attached pedigree. Of significance, sister with history of migraines.     The father of the baby, Nathan, is a 31 year old male with a history of dyslipidemia and recent jaw surgery due to a recessed jaw. Identical twin brother with anxiety and depression. Sibling with anxiety and depression. Mother with high cholesterol, HTN, anxiety, and depression. Father with high cholesterol, skin cancer (possible basal cell carcinoma), asthma, diabetes, high blood pressure, and anxiety.     We do not have medical records regarding any of these diagnoses.     Information Discussed:   1) Cleft lip and cleft palate (CL/CP)  Clefts are one of the most common birth defects, affecting around 1 in 700 newborns. A cleft lip is an opening in the upper lip. A cleft can occur on one side of the lip, called unilateral, or on both sides, called bilateral. A cleft can also occur in the gums where the teeth will grow. A cleft palate is an opening in the roof of the mouth. A person can be born with cleft lip, a cleft palate, or both. Cleft lip can often be seen on fetal ultrasound. It is more difficult to clearly determine  prenatally if a cleft palate is present.     In the earliest days of a baby’s development in the womb, there is normally a split (called a cleft) between the right and left sides of the lip and the roof of the mouth (called the palate). Sometime during the 6th to 11th week of pregnancy, this split comes together to form the lips and mouth. If the tissue doesn’t join, it can cause a cleft lip or a cleft palate    Children with a cleft lip or a cleft palate, depending on the size of the openings, may have problems eating and breathing. As they grow older, they may also have speech and language delays. Children with cleft lip or palate are also more likely to have ear infections, hearing loss, and problems with their teeth. Individuals with clefts will be cared for by a team of specialists who can help with many aspects of case including feeding, surgery, speech therapy, and dentistry. Surgery for clefts is often performed within the first year or two of life.     There are more than 1,000 different disorders that can cause craniofaical anomalies, CL/CP, and CP. Causes could be environmental, genetic, or multifactorial (a complex mix of environmental and genetic factors). A genetic etiology can be associated with missing or duplicated DNA, or with a single gene variant. The condition could be syndromic, when there are multiple features, or nonsyndromic. The cause could be inherited from a parent, or new in the affected individual.     Cleft lip with or without cleft palate (CL/CP): Around 30% of cases of CL/CP are associated with an underlying syndrome or disorder with multiple anomalies. The remaining 70% are nonsyndromic, and typically multifactorial. Recurrence risk without an identified genetic or environmental cause is around 3-5% for future children, and is higher if multiple family members are affected. Common syndromes associated with CL/CP include 22q11.2 deletion, trisomy 13, James-Lemli-Opitz, and many  others. This pregnancy is assumed to be low risk for aneuploidies like trisomy 13, given the low risk Panorama result (per patient report). Associated brain anomalies, eye malformations, congenital heart defects, and hearing loss are the most commonly associated features. Genes associated with nonsyndromic CL/CP include IRF6, TGF-A, and many others.    Resources:  American Cleft Palate Craniofacial Association, acpacares.org. This site has extensive information about CL/CP.  FACES - National Craniofacial Association, www.faces-cranio.org. FACES has financial support and other resources (including a summer camp!) for children with craniofacial differences.  PPI for Clefts, www.cuddlesforclefts.com. Website with support boxes and shop including stuffed animals with cleft repair.  Cleft Lip and Palate Association, www.[employer].com. This is a site from the [place], so resources aren't specific for our area, but they have galleries with lots of pictures of smiling babies and children who have cleft lip.    2) Genetic testing - Carrier screening  Carrier screening is a genetic test that can help detect if a couple is at increased risk of having a baby with a specific inherited disorder, such as Hector-Sachs disease or cystic fibrosis. Some conditions on carrier screening are associated with CL/CP. Carrier screening usually focuses on severe disorders that affect an individual's quality of life from an early age. It is recommended that carrier screening be offered to all people who are currently pregnant, considering pregnancy, or might otherwise biologically contribute to pregnancy. In most cases, sequencing tests are recommended over variant panels to provide equitable care (Monica et al., 2023, PMID [id number]). Carrier screening facilitates informed decision-making and early preparation. While there is no test that can screen for all possible genetic conditions or birth defects, genetic carrier screening can give people  information to make reproductive choices that are right for them. Carrier screening is optional; individuals are not required to undergo carrier screening.    Conditions included on carrier screening panels may have autosomal recessive or X-linked inheritance patterns.   For autosomal recessive conditions, a carrier is a person who has one normal copy of a gene and one copy of the gene with a pathogenic, or disease-causing, variant. When both parents are carriers of the same condition, there is a 25% likelihood that a child would inherit two pathogenic variants (one from each parent) and be affected by the condition. If one parent has positive carrier screening results for a condition, the other parent should be offered carrier screening for the same condition so the risk for offspring can be assessed.  For X-linked conditions, only the biological mother needs to be a carrier to have an affected child, and males and females may be affected differently.     Possible results include positive or negative results.   Positive results mean that a disease-causing variant was detected. In some cases, positive results may have impacts on the patient's healthcare recommendations. For example, DMD carriers are at risk for heart disease, so they should see a cardiologist. When looking at a large group of conditions, more than half of people have at least one positive carrier result.  Negative/normal results mean that a disease-causing variant was not detected. A negative result reduces the risk of being a carrier for the condition(s) tested. However, it does not eliminate the risk of carrying disease-causing variant. This is called residual risk.   Uncertain results mean that a variant was detected, but there currently isn't enough information to determine whether the variant is disease-causing or part of normal variation. Uncertain results typically do not affect clinical management, and are not typically reported for carrier  screening.    Ms. Buchanan consented to carrier screening today. She opted for the Futubra 613 panel, HT51643.1. This includes 613 hereditary conditions. Blood sample drawn today. Her partner Nathan also had Niesha Precyse 613 ordered today.    3) Genetic testing - Cell free single gene NIPT, Vistara  Vistara is a blood test that can report the risk for a fetus to be affected with various conditions. It uses non-invasive prenatal testing (NIPT) for single genes. NIPT analyzes cell free DNA from the placenta that is circulating in maternal blood. Vistara looks at genes associated with 25 autosomal dominant conditions. Conditions include achondroplasia, CHARGE syndrome, Brenda syndrome, tuberous sclerosis 1 and 2, and others. Some of these conditions are associated with CL/CP.    Cell free NIPT is a screening test. NIPT does not provide a diagnosis of a genetic condition. Cell free NIPT analyzes DNA from the placenta. Confirmatory testing can be performed during pregnancy or following delivery. NIPT does not assess risk for all genetic conditions or congenital anomalies. A low risk NIPT result does not ensure a healthy pregnancy. NIPT does not assess risk for neural tube defects. If the gestational parent is affected by one of the conditions tested, the test will not be completed.    Possible results include low-risk or high-risk for a specific condition. It is also possible to receive other results, including low fetal fraction, atypical, or no-call. Sometimes it is recommended to run the test again, and sometimes it is recommended to move to diagnostic testing instead.    Ms. Buchanan consented to Vistara testing. Blood sample drawn today.    4) Further genetics evaluation  We discussed that genetic testing is an option prenatally with amniocentesis. During an amniocentesis, a provider inserts a needle through the pregnant parent's abdomen to collect amniotic fluid. Ultrasound is used to guide the positioning of  the needle. The risk of complications, including interruption of pregnancy and infection following an amniocentesis is approximately 1 in 900. Hodan Buchanan declined amniocentesis at this time. If they would like this later in pregnancy, I recommend fetal microarray with reflex to whole exome sequencing.    The family can request a genetics evaluation after delivery if they would like to assess for possible genetic etiology. Baby could be referred to [facility] or [facility]. Note that I'm not sure whether [facility] sees isolated cleft lip patients for genetic evaluation. They may be able to have a telehealth visit with a [facility] genetics provider.      Follow-up Plan:    1) Amniocentesis was declined by the patient today.  2) The patient consented to genetic testing. Genetic testing was ordered: Niesha Roach and Shahram 613, [id number]. Results are expected in 2-3 weeks. We will call with results.     Please feel free to contact me with additional questions at [phone] or [email or web address].    I personally spent 70 minutes caring for the patient today. This time includes chart review, time spent during the visit, and time spent after the visit on documentation and follow-up. I provided genetic counseling services, including obtaining a structured family history, analysis of genetic and other risks, counseling of the patient regarding cleft lip and palate, review of medical information, review of previous test results, and discussion of genetic testing options.    Dee Dickson MS, [facility] [place]etic Counselor  [facility]

## 2025-04-18 ENCOUNTER — CLINICAL SUPPORT (OUTPATIENT)
Dept: GENETICS | Facility: HOSPITAL | Age: 30
End: 2025-04-18
Payer: COMMERCIAL

## 2025-04-18 ENCOUNTER — LAB (OUTPATIENT)
Dept: LAB | Facility: HOSPITAL | Age: 30
End: 2025-04-18
Payer: COMMERCIAL

## 2025-04-18 DIAGNOSIS — Z31.430 ENCOUNTER OF FEMALE FOR TESTING FOR GENETIC DISEASE CARRIER STATUS FOR PROCREATIVE MANAGEMENT: ICD-10-CM

## 2025-04-18 DIAGNOSIS — O28.3 ABNORMAL FETAL ULTRASOUND: Primary | ICD-10-CM

## 2025-04-18 DIAGNOSIS — O28.3 ABNORMAL FETAL ULTRASOUND: ICD-10-CM

## 2025-04-18 PROCEDURE — 96041 GENETIC COUNSELING SVC EA 30: CPT

## 2025-04-23 ENCOUNTER — DOCUMENTATION (OUTPATIENT)
Dept: NURSERY | Facility: HOSPITAL | Age: 30
End: 2025-04-23
Payer: COMMERCIAL

## 2025-04-23 NOTE — PROGRESS NOTES
DATE: 2025  MRN: [id number]      Patient Name: Hodan Buchanan  YOB: 1995    Dear Health Care Provider,    The patient listed above was discussed at the [facility] Fetal Care Conference.     The fetal diagnosis is:   Unilateral right cleft lip, palate suboptimal       Recommendations:  Delivery is currently planned at [facility]    Plan is for the baby to be admitted to the  Nursery if there are no additional concerns    After admission the plan is for Outpatient Follow Up with [facility]    Please be aware that the plan may change if more information is obtained during the prenatal course.    Please feel free to call with any questions:    Maternal Fetal Medicine at [phone]  Pediatric Cardiology at [phone]  Neonatology at [phone]    Electronically signed by Anjum Gifford MD, 25, 1:22 PM EDT.

## 2025-05-01 ENCOUNTER — DOCUMENTATION (OUTPATIENT)
Dept: OBSTETRICS AND GYNECOLOGY | Facility: CLINIC | Age: 30
End: 2025-05-01
Payer: COMMERCIAL

## 2025-05-01 ENCOUNTER — TELEPHONE (OUTPATIENT)
Dept: OBSTETRICS AND GYNECOLOGY | Facility: CLINIC | Age: 30
End: 2025-05-01
Payer: COMMERCIAL

## 2025-05-01 NOTE — PROGRESS NOTES
5/1/2025     I spoke with Hodan Buchanan on the phone to discuss the results of her recent genetic testing. I previously saw her on 4/18/25 in consultation for a prenatal diagnosis of fetal cleft lip (unilateral, right side) and possible cleft palate. Clinical Support with Dee Dickson GC (04/18/2025)      At that visit, genetic testing was ordered: Niesha Horizon 613, and Niesha Vistara. Horizon screens patients for autosomal recessive and X-linked conditions to determine if there is a hereditary disorder thier children are at risk for. Vistara uses cell free DNA to assess fetal risk for 25 autosomal dominant conditions.     We discussed the results: Vistara was negative for all conditions tested. Horizon was positive, but the couple had no overlapping conditions. Therefore, there were no conditions identified that their children would be at risk for. 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 Treatment is available. People who are carriers for this condition are typically unaffected. Some rare AQP2 variants can cause autosomal dominant inheritance, but this variant has not been show to have dominant inheritance.  Carrier for Reji Syndrome and other related disorders  pathogenic variant in the TMEM67 gene (c.651+2T>G)  The condition causes brain concerns, kidney problems, and/or other health issues. People who are carriers for this condition are typically unaffected.   Carrier for  Steroid?Resistant Nephrotic Syndrome  Likely pathogenic variant in the NPHS2 gene (c.686G>A, p.R229Q)  The condition causes abnormal kidney function. People who are carriers for this condition are typically unaffected.      Other members of Hodan's family are at risk to be carriers and can consider genetic carrier screening for the  conditions above.    Negative results greatly reduce the risk of being a carrier for the conditions tested, or being affected by the dominant conditions. However, these are screening tests, and the results do not completely eliminate the risk of carrying disease-causing variant. This is called residual risk.      The results do not provide an explanation for the symptoms affecting their pregnancy. Further genetic testing and/or  evaluation may be considered.     Please feel free to contact me with additional questions at [phone].      Dee Dickson MS, [facility] [place]  Genetic Counselor  [facility]

## 2025-05-01 NOTE — TELEPHONE ENCOUNTER
5/1/2025    I called Hodan Buchanan to discuss genetic testing results. I left a voicemail requesting she call me back.

## 2025-05-07 ENCOUNTER — TRANSCRIBE ORDERS (OUTPATIENT)
Dept: ULTRASOUND IMAGING | Facility: HOSPITAL | Age: 30
End: 2025-05-07
Payer: COMMERCIAL

## 2025-05-07 DIAGNOSIS — O28.3 ABNORMAL FETAL ULTRASOUND: Primary | ICD-10-CM

## 2025-05-08 ENCOUNTER — OFFICE VISIT (OUTPATIENT)
Dept: OBSTETRICS AND GYNECOLOGY | Facility: CLINIC | Age: 30
End: 2025-05-08
Payer: COMMERCIAL

## 2025-05-08 ENCOUNTER — HOSPITAL ENCOUNTER (OUTPATIENT)
Dept: ULTRASOUND IMAGING | Facility: HOSPITAL | Age: 30
Discharge: HOME | End: 2025-05-08
Admitting: OBSTETRICS & GYNECOLOGY
Payer: COMMERCIAL

## 2025-05-08 VITALS
HEART RATE: 85 BPM | OXYGEN SATURATION: 98 % | BODY MASS INDEX: 50.02 KG/M2 | DIASTOLIC BLOOD PRESSURE: 79 MMHG | SYSTOLIC BLOOD PRESSURE: 130 MMHG | HEIGHT: 64 IN | TEMPERATURE: 96.9 F | WEIGHT: 293 LBS

## 2025-05-08 DIAGNOSIS — O35.AXX0 FETAL CLEFT LIP AND PALATE AFFECTING ANTEPARTUM CARE OF MOTHER, SINGLE OR UNSPECIFIED FETUS: Primary | ICD-10-CM

## 2025-05-08 DIAGNOSIS — O28.3 ABNORMAL FETAL ULTRASOUND: ICD-10-CM

## 2025-05-08 DIAGNOSIS — Z3A.28 28 WEEKS GESTATION OF PREGNANCY: ICD-10-CM

## 2025-05-08 PROCEDURE — 76819 FETAL BIOPHYS PROFIL W/O NST: CPT

## 2025-05-08 PROCEDURE — 76816 OB US FOLLOW-UP PER FETUS: CPT

## 2025-05-08 NOTE — LETTER
May 8, 2025     Beth Rose MD  [address] [facility]  [address]  [place] [id number]    Patient: Hodan Buchanan   YOB: 1995   Date of Visit: 2025       Dear Beth Rose MD    Hodan Buchanan was in my office today. Below is a copy of my note.    If you have questions, please do not hesitate to call me. I look forward to following Hodan along with you.         Sincerely,        Randi Fernandez MD        CC: No Recipients    MATERNAL FETAL MEDICINE Consult Note    Dear Dr Beth Rose MD:    Thank you for your kind referral of Hodan Buchanan.  As you know, she is a 29 y.o.   28w2d gestation (Estimated Date of Delivery: 25). This is a consult.      Her antepartum course is complicated by:  Placenta previa --resolved  Unilateral right cleft lip AND  palate     Aneuploidy Screening: Per patient report low risk panorama    HPI: No contractions, LOF, vaginal bleeding. Normal fetal movement.   She denies headache, vision changes, shortness of breath, acute changes in edema, and RUQ pain.   Doing well, has glucose test at next visit.     Review of History:  Past Medical History:   Diagnosis Date   • Dysmenorrhea    • Endometriosis    • Gestational HTN     Labetalol 6 weeks PP   • Low back pain    • Peripheral neuropathy    • PONV (postoperative nausea and vomiting)    • Postpartum hemorrhage    • UTI (urinary tract infection)     FREQUENT UTI'S     Past Surgical History:   Procedure Laterality Date   • DIAGNOSTIC LAPAROSCOPY N/A 2019    Procedure: DIAGNOSTIC LAPAROSCOPY, cautery of endometriosis;  Surgeon: Rossi Salazar MD;  [place] [facility] [place] [facility] [place]  Service: Obstetrics/Gynecology   • EPIDURAL BLOCK     • WISDOM TOOTH EXTRACTION         OB Hx:  OB History       2    Para   1    Term   1            AB        Living   1      SAB        IAB        Ectopic        Molar        Multiple   0    Live Births   1         # Outcome Date GA Labor/2nd Weight Sex Type Anes PTL  "Lv A1 A5   2 Current                 1 Term 11/09/22 38w3d 10h 06m / 1h 44m 3240 g (7 lb 2.3 oz) F Vag-Spont Epidural N Living 8 9   Name: ALEM CASTANO   Complications: Postpartum Hemorrhage   Birth Comments: [place] MARIBELR 6   Location: [facility] ( [facility])   Delivering Clinician: Rossi Salazar MD          Social History     Socioeconomic History   • Marital status:    Tobacco Use   • Smoking status: Never     Passive exposure: Never   • Smokeless tobacco: Never   Vaping Use   • Vaping status: Never Used   Substance and Sexual Activity   • Alcohol use: Not Currently     Comment: SOCIALLY   • Drug use: No   • Sexual activity: Defer     Family History   Problem Relation Age of Onset   • Malig Hyperthermia Neg Hx       No Known Allergies   Current Outpatient Medications on File Prior to Visit   Medication Sig Dispense Refill   • aspirin 81 MG oral suspension      • Prenatal Vit-Fe Fumarate-FA (prenatal vitamin 27-0.8) 27-0.8 MG tablet tablet Take 1 tablet by mouth Daily.       No current facility-administered medications on file prior to visit.        Past obstetric, gynecological, medical, surgical, family and social history reviewed.  Relevant lab work and imaging reviewed.    Review of systems  As above in HPI     Vitals:    05/08/25 0933   BP: 130/79   BP Location: Right arm   Patient Position: Sitting   Pulse: 85   Temp: 96.9 °F (36.1 °C)   TempSrc: Temporal   SpO2: 98%   Weight: (!) 138 kg (303 lb 9.6 oz)   Height: 162.6 cm (64\")         PHYSICAL EXAM   General: No acute distress  Respiratory: No increased work of breathing  Cardiac: reg rate   Abdominal: Gravid, nontender  Extremities: No significant edema  Neuro/psych: Alert and oriented, appropriate mood      ULTRASOUND   Please view full ultrasound note on Imaging tab in ViewPoint.  Cephalic presentation  Posterior placenta  SUKH 10.8 cm which is normal  Fetal biometry is consistent with dates EFW 89%, AC 95%  The nose and lip is " difficult to visualize today, cleft lip and palate was visualized today.  The fetal anatomic survey is now successfully obtained and appears normal with exception of the cleft lip and palate       ASSESSMENT/COUNSELING:    Diagnoses and all orders for this visit:    1. Fetal cleft lip and palate affecting antepartum care of mother, single or unspecified fetus (Primary)    2. 28 weeks gestation of pregnancy           Cleft Lip/Palate, unilateral   S/p genetic counseling Progress Notes by Dee Dickson GC (2025 10:00)   S/p negative vistara   S/p Low risk NIPS   S/p expanded carrier screening, no overlapping conditions   S/p OMFS consult  see media for note.     Previous counseled.   Today US cleft lip and palate were identified. Visualization of the palate was difficult, but it appears to have a cleft. No evidence of polyhydramnios.   We reviewed that the fetal growth is on the high end of normal. She expressed understanding. She has not completed her glucose tolerance test yet. Had an elevated fasting value early in pregnancy and passed her three hour. Is scheduled for GCT in 1 week.     History of gestational hypertension   Is compliant with low-dose aspirin  Recommend completion of baseline CMP, CBC, urine protein assessment  I discussed the increased rate of recurrence in a subsequent pregnancy.  She delivered at term therefore no additional monitoring or workup is necessary.    Summary of Plan  -Next growth with MFM per patient preference.   -S/p OMFS   -S/p  Genetic counseling   -Serial growth ultrasounds every 4  weeks (Next by MFM, then will discuss location for remainder)   -Starting at 32 weeks: Weekly fetal  surveillance until delivery   -Starting at 28 weeks: Fetal movement instructions given continue daily until delivery; instructed to report to labor and delivery if cannot achieve more than 10 kicks in one hour or if she perceives a decrease in fetal movement  -Delivery 39 weeks      Follow-up: 4 WEEKS    Thank you for the consult and opportunity to care for this patient.  Please feel free to reach out with any questions or concerns.    I spent 10 minutes caring for this patient on this date of service. This time includes time spent by me in the following activities: preparing for the visit, reviewing tests, obtaining and/or reviewing a separately obtained history, performing a medically appropriate examination and/or evaluation, counseling and educating the patient/family/caregiver and independently interpreting results and communicating that information with the patient/family/caregiver with greater than 50% spent in counseling and coordination of care.      I spent 4 minutes on the separately reported service of US imaging not included in the time used to support the E/M service also reported today.      Randi Fernandez MD   Maternal Fetal Medicine-[facility]  Office: [phone]      Pt reports that she is doing well and denies vaginal bleeding, cramping, contractions or LOF at this time. Reports active fetal movement. Reviewed when to call OB office or present to L&D for evaluation with symptoms such as decreased fetal movement, vaginal bleeding, LOF or ctxs. Pt verbalized understanding. Denies HA, visual changes or epigastric pain. Denies any additional complaints at time of appointment. Next OB appointment scheduled for 5/15.       Vitals:    05/08/25 0933   BP: 130/79   Pulse: 85   Temp: 96.9 °F (36.1 °C)   SpO2: 98%

## 2025-05-08 NOTE — PROGRESS NOTES
MATERNAL FETAL MEDICINE Consult Note    Dear Dr Beth Rose MD:    Thank you for your kind referral of Hodan Buchanan.  As you know, she is a 29 y.o.   28w2d gestation (Estimated Date of Delivery: 25). This is a consult.      Her antepartum course is complicated by:  Placenta previa --resolved  Unilateral right cleft lip AND  palate     Aneuploidy Screening: Per patient report low risk panorama    HPI: No contractions, LOF, vaginal bleeding. Normal fetal movement.   She denies headache, vision changes, shortness of breath, acute changes in edema, and RUQ pain.   Doing well, has glucose test at next visit.     Review of History:  Past Medical History:   Diagnosis Date    Dysmenorrhea     Endometriosis     Gestational HTN     Labetalol 6 weeks PP    Low back pain     Peripheral neuropathy     PONV (postoperative nausea and vomiting)     Postpartum hemorrhage     UTI (urinary tract infection)     FREQUENT UTI'S     Past Surgical History:   Procedure Laterality Date    DIAGNOSTIC LAPAROSCOPY N/A 2019    Procedure: DIAGNOSTIC LAPAROSCOPY, cautery of endometriosis;  Surgeon: Rossi Salazar MD;  [place] [facility] [place] [facility] [place]  Service: Obstetrics/Gynecology    EPIDURAL BLOCK      WISDOM TOOTH EXTRACTION         OB Hx:  OB History       2    Para   1    Term   1            AB        Living   1      SAB        IAB        Ectopic        Molar        Multiple   0    Live Births   1         # Outcome Date GA Labor/2nd Weight Sex Type Anes PTL Lv A1 A5   2 Current                 1 Term 22 38w3d 10h 06m / 1h 44m 3240 g (7 lb 2.3 oz) F Vag-Spont Epidural N Living 8 9   Name: ALEM BUCHANAN   Complications: Postpartum Hemorrhage   Birth Comments: Abraham LÓPEZR 6   Location: [facility] ([facility] LABOR DELIVERY)   Delivering Clinician: Rossi Salazar MD          Social History     Socioeconomic History    Marital status:    Tobacco Use    Smoking status: Never     Passive exposure: Never  "   Smokeless tobacco: Never   Vaping Use    Vaping status: Never Used   Substance and Sexual Activity    Alcohol use: Not Currently     Comment: SOCIALLY    Drug use: No    Sexual activity: Defer     Family History   Problem Relation Age of Onset    Malig Hyperthermia Neg Hx       No Known Allergies   Current Outpatient Medications on File Prior to Visit   Medication Sig Dispense Refill    aspirin 81 MG oral suspension       Prenatal Vit-Fe Fumarate-FA (prenatal vitamin 27-0.8) 27-0.8 MG tablet tablet Take 1 tablet by mouth Daily.       No current facility-administered medications on file prior to visit.        Past obstetric, gynecological, medical, surgical, family and social history reviewed.  Relevant lab work and imaging reviewed.    Review of systems  As above in HPI     Vitals:    05/08/25 0933   BP: 130/79   BP Location: Right arm   Patient Position: Sitting   Pulse: 85   Temp: 96.9 °F (36.1 °C)   TempSrc: Temporal   SpO2: 98%   Weight: (!) 138 kg (303 lb 9.6 oz)   Height: 162.6 cm (64\")         PHYSICAL EXAM   General: No acute distress  Respiratory: No increased work of breathing  Cardiac: reg rate   Abdominal: Gravid, nontender  Extremities: No significant edema  Neuro/psych: Alert and oriented, appropriate mood      ULTRASOUND   Please view full ultrasound note on Imaging tab in ViewPoint.  Cephalic presentation  Posterior placenta  SUKH 10.8 cm which is normal  Fetal biometry is consistent with dates EFW 89%, AC 95%  The nose and lip is difficult to visualize today, cleft lip and palate was visualized today.  The fetal anatomic survey is now successfully obtained and appears normal with exception of the cleft lip and palate       ASSESSMENT/COUNSELING:    Diagnoses and all orders for this visit:    1. Fetal cleft lip and palate affecting antepartum care of mother, single or unspecified fetus (Primary)    2. 28 weeks gestation of pregnancy           Cleft Lip/Palate, unilateral   S/p genetic counseling " Progress Notes by Dee Dickson GC (2025 10:00)   S/p negative vistara   S/p Low risk NIPS   S/p expanded carrier screening, no overlapping conditions   S/p OMFS consult  see media for note.     Previous counseled.   Today US cleft lip and palate were identified. Visualization of the palate was difficult, but it appears to have a cleft. No evidence of polyhydramnios.   We reviewed that the fetal growth is on the high end of normal. She expressed understanding. She has not completed her glucose tolerance test yet. Had an elevated fasting value early in pregnancy and passed her three hour. Is scheduled for GCT in 1 week.     History of gestational hypertension   Is compliant with low-dose aspirin  Recommend completion of baseline CMP, CBC, urine protein assessment  I discussed the increased rate of recurrence in a subsequent pregnancy.  She delivered at term therefore no additional monitoring or workup is necessary.    Summary of Plan  -Next growth with MFM per patient preference.   -S/p OMFS   -S/p  Genetic counseling   -Serial growth ultrasounds every 4  weeks (Next by MFM, then will discuss location for remainder)   -Starting at 32 weeks: Weekly fetal  surveillance until delivery   -Starting at 28 weeks: Fetal movement instructions given continue daily until delivery; instructed to report to labor and delivery if cannot achieve more than 10 kicks in one hour or if she perceives a decrease in fetal movement  -Delivery 39 weeks     Follow-up: 4 WEEKS    Thank you for the consult and opportunity to care for this patient.  Please feel free to reach out with any questions or concerns.    I spent 10 minutes caring for this patient on this date of service. This time includes time spent by me in the following activities: preparing for the visit, reviewing tests, obtaining and/or reviewing a separately obtained history, performing a medically appropriate examination and/or evaluation, counseling and  educating the patient/family/caregiver and independently interpreting results and communicating that information with the patient/family/caregiver with greater than 50% spent in counseling and coordination of care.      I spent 4 minutes on the separately reported service of US imaging not included in the time used to support the E/M service also reported today.      Randi Fernandez MD   Maternal Fetal Medicine-[facility]  Office: [phone]

## 2025-05-08 NOTE — PROGRESS NOTES
Pt reports that she is doing well and denies vaginal bleeding, cramping, contractions or LOF at this time. Reports active fetal movement. Reviewed when to call OB office or present to L&D for evaluation with symptoms such as decreased fetal movement, vaginal bleeding, LOF or ctxs. Pt verbalized understanding. Denies HA, visual changes or epigastric pain. Denies any additional complaints at time of appointment. Next OB appointment scheduled for 5/15.       Vitals:    05/08/25 0933   BP: 130/79   Pulse: 85   Temp: 96.9 °F (36.1 °C)   SpO2: 98%

## 2025-06-09 ENCOUNTER — PATIENT ROUNDING (BHMG ONLY) (OUTPATIENT)
Dept: URGENT CARE | Facility: CLINIC | Age: 30
End: 2025-06-09
Payer: COMMERCIAL

## 2025-06-09 NOTE — ED NOTES
Thank you for letting us care for you in your recent visit to our urgent care center. We would love to hear about your experience with us. Was this the first time you have visited our location?    We’re always looking for ways to make our patients’ experiences even better. Do you have any recommendations on ways we may improve?     I appreciate you taking the time to respond. Please be on the lookout for a survey about your recent visit from [employer] via text or email. We would greatly appreciate if you could fill that out and turn it back in. We want your voice to be heard and we value your feedback.   Thank you for choosing [facility] for your healthcare needs.

## 2025-07-03 LAB — EXTERNAL GROUP B STREP ANTIGEN: NEGATIVE

## 2025-07-24 ENCOUNTER — HOSPITAL ENCOUNTER (OUTPATIENT)
Dept: LABOR AND DELIVERY | Facility: HOSPITAL | Age: 30
Discharge: HOME | End: 2025-07-24
Payer: COMMERCIAL

## 2025-07-25 ENCOUNTER — HOSPITAL ENCOUNTER (INPATIENT)
Facility: HOSPITAL | Age: 30
LOS: 3 days | Discharge: HOME | End: 2025-07-28
Attending: STUDENT IN AN ORGANIZED HEALTH CARE EDUCATION/TRAINING PROGRAM | Admitting: OBSTETRICS & GYNECOLOGY
Payer: COMMERCIAL

## 2025-07-25 ENCOUNTER — ANESTHESIA (OUTPATIENT)
Dept: LABOR AND DELIVERY | Facility: HOSPITAL | Age: 30
End: 2025-07-25
Payer: COMMERCIAL

## 2025-07-25 ENCOUNTER — ANESTHESIA EVENT (OUTPATIENT)
Dept: LABOR AND DELIVERY | Facility: HOSPITAL | Age: 30
End: 2025-07-25
Payer: COMMERCIAL

## 2025-07-25 LAB
ABO GROUP BLD: NORMAL
ALBUMIN SERPL-MCNC: 3.3 G/DL (ref 3.5–5.2)
ALBUMIN/GLOB SERPL: 1.1 G/DL
ALP SERPL-CCNC: 122 U/L (ref 39–117)
ALT SERPL W P-5'-P-CCNC: 10 U/L (ref 1–33)
ANION GAP SERPL CALCULATED.3IONS-SCNC: 13 MMOL/L (ref 5–15)
AST SERPL-CCNC: 18 U/L (ref 1–32)
BASOPHILS # BLD AUTO: 0.01 10*3/MM3 (ref 0–0.2)
BASOPHILS NFR BLD AUTO: 0.1 % (ref 0–1.5)
BILIRUB SERPL-MCNC: 0.2 MG/DL (ref 0–1.2)
BLD GP AB SCN SERPL QL: NEGATIVE
BUN SERPL-MCNC: 9 MG/DL (ref 6–20)
BUN/CREAT SERPL: 15 (ref 7–25)
CALCIUM SPEC-SCNC: 8.9 MG/DL (ref 8.6–10.5)
CHLORIDE SERPL-SCNC: 104 MMOL/L (ref 98–107)
CO2 SERPL-SCNC: 19 MMOL/L (ref 22–29)
CREAT SERPL-MCNC: 0.6 MG/DL (ref 0.57–1)
DEPRECATED RDW RBC AUTO: 41.7 FL (ref 37–54)
EGFRCR SERPLBLD CKD-EPI 2021: 124 ML/MIN/1.73
EOSINOPHIL # BLD AUTO: 0.1 10*3/MM3 (ref 0–0.4)
EOSINOPHIL NFR BLD AUTO: 1 % (ref 0.3–6.2)
ERYTHROCYTE [DISTWIDTH] IN BLOOD BY AUTOMATED COUNT: 12.9 % (ref 12.3–15.4)
GLOBULIN UR ELPH-MCNC: 3 GM/DL
GLUCOSE SERPL-MCNC: 123 MG/DL (ref 65–99)
HCT VFR BLD AUTO: 36.4 % (ref 34–46.6)
HGB BLD-MCNC: 12.1 G/DL (ref 12–15.9)
IMM GRANULOCYTES # BLD AUTO: 0.04 10*3/MM3 (ref 0–0.05)
IMM GRANULOCYTES NFR BLD AUTO: 0.4 % (ref 0–0.5)
LYMPHOCYTES # BLD AUTO: 2.24 10*3/MM3 (ref 0.7–3.1)
LYMPHOCYTES NFR BLD AUTO: 22.5 % (ref 19.6–45.3)
MCH RBC QN AUTO: 30.1 PG (ref 26.6–33)
MCHC RBC AUTO-ENTMCNC: 33.2 G/DL (ref 31.5–35.7)
MCV RBC AUTO: 90.5 FL (ref 79–97)
MONOCYTES # BLD AUTO: 0.66 10*3/MM3 (ref 0.1–0.9)
MONOCYTES NFR BLD AUTO: 6.6 % (ref 5–12)
NEUTROPHILS NFR BLD AUTO: 6.9 10*3/MM3 (ref 1.7–7)
NEUTROPHILS NFR BLD AUTO: 69.4 % (ref 42.7–76)
NRBC BLD AUTO-RTO: 0 /100 WBC (ref 0–0.2)
PLATELET # BLD AUTO: 174 10*3/MM3 (ref 140–450)
PMV BLD AUTO: 11 FL (ref 6–12)
POTASSIUM SERPL-SCNC: 3.9 MMOL/L (ref 3.5–5.2)
PROT SERPL-MCNC: 6.3 G/DL (ref 6–8.5)
RBC # BLD AUTO: 4.02 10*6/MM3 (ref 3.77–5.28)
RH BLD: POSITIVE
SODIUM SERPL-SCNC: 136 MMOL/L (ref 136–145)
T&S EXPIRATION DATE: NORMAL
TREPONEMA PALLIDUM IGG+IGM AB [PRESENCE] IN SERUM OR PLASMA BY IMMUNOASSAY: NORMAL
WBC NRBC COR # BLD AUTO: 9.95 10*3/MM3 (ref 3.4–10.8)

## 2025-07-25 PROCEDURE — 3E033VJ INTRODUCTION OF OTHER HORMONE INTO PERIPHERAL VEIN, PERCUTANEOUS APPROACH: ICD-10-PCS | Performed by: OBSTETRICS & GYNECOLOGY

## 2025-07-25 PROCEDURE — 86780 TREPONEMA PALLIDUM: CPT | Performed by: OBSTETRICS & GYNECOLOGY

## 2025-07-25 PROCEDURE — 86901 BLOOD TYPING SEROLOGIC RH(D): CPT | Performed by: OBSTETRICS & GYNECOLOGY

## 2025-07-25 PROCEDURE — 85025 COMPLETE CBC W/AUTO DIFF WBC: CPT | Performed by: OBSTETRICS & GYNECOLOGY

## 2025-07-25 PROCEDURE — 86900 BLOOD TYPING SEROLOGIC ABO: CPT | Performed by: OBSTETRICS & GYNECOLOGY

## 2025-07-25 PROCEDURE — 25810000003 LACTATED RINGERS PER 1000 ML: Performed by: OBSTETRICS & GYNECOLOGY

## 2025-07-25 PROCEDURE — 80053 COMPREHEN METABOLIC PANEL: CPT | Performed by: OBSTETRICS & GYNECOLOGY

## 2025-07-25 PROCEDURE — 86850 RBC ANTIBODY SCREEN: CPT | Performed by: OBSTETRICS & GYNECOLOGY

## 2025-07-25 PROCEDURE — 25010000002 FAMOTIDINE 10 MG/ML SOLUTION: Performed by: OBSTETRICS & GYNECOLOGY

## 2025-07-25 RX ORDER — ONDANSETRON 2 MG/ML
4 INJECTION INTRAMUSCULAR; INTRAVENOUS ONCE AS NEEDED
Status: DISCONTINUED | OUTPATIENT
Start: 2025-07-25 | End: 2025-07-26

## 2025-07-25 RX ORDER — SODIUM CHLORIDE 9 MG/ML
40 INJECTION, SOLUTION INTRAVENOUS AS NEEDED
Status: DISCONTINUED | OUTPATIENT
Start: 2025-07-25 | End: 2025-07-26

## 2025-07-25 RX ORDER — ACETAMINOPHEN 325 MG/1
650 TABLET ORAL EVERY 4 HOURS PRN
Status: DISCONTINUED | OUTPATIENT
Start: 2025-07-25 | End: 2025-07-26

## 2025-07-25 RX ORDER — MAGNESIUM CARB/ALUMINUM HYDROX 105-160MG
30 TABLET,CHEWABLE ORAL ONCE AS NEEDED
Status: DISCONTINUED | OUTPATIENT
Start: 2025-07-25 | End: 2025-07-26

## 2025-07-25 RX ORDER — ONDANSETRON 2 MG/ML
4 INJECTION INTRAMUSCULAR; INTRAVENOUS EVERY 6 HOURS PRN
Status: DISCONTINUED | OUTPATIENT
Start: 2025-07-25 | End: 2025-07-26

## 2025-07-25 RX ORDER — LIDOCAINE HYDROCHLORIDE 10 MG/ML
0.5 INJECTION, SOLUTION INFILTRATION; PERINEURAL ONCE AS NEEDED
Status: DISCONTINUED | OUTPATIENT
Start: 2025-07-25 | End: 2025-07-26

## 2025-07-25 RX ORDER — SODIUM CHLORIDE 0.9 % (FLUSH) 0.9 %
10 SYRINGE (ML) INJECTION EVERY 12 HOURS SCHEDULED
Status: DISCONTINUED | OUTPATIENT
Start: 2025-07-25 | End: 2025-07-26

## 2025-07-25 RX ORDER — FAMOTIDINE 20 MG/1
20 TABLET, FILM COATED ORAL 2 TIMES DAILY PRN
Status: DISCONTINUED | OUTPATIENT
Start: 2025-07-25 | End: 2025-07-26

## 2025-07-25 RX ORDER — EPHEDRINE SULFATE 50 MG/ML
5 INJECTION, SOLUTION INTRAVENOUS
Status: DISCONTINUED | OUTPATIENT
Start: 2025-07-25 | End: 2025-07-26

## 2025-07-25 RX ORDER — OXYTOCIN/0.9 % SODIUM CHLORIDE 30/500 ML
2-20 PLASTIC BAG, INJECTION (ML) INTRAVENOUS
Status: DISCONTINUED | OUTPATIENT
Start: 2025-07-25 | End: 2025-07-26

## 2025-07-25 RX ORDER — ONDANSETRON 4 MG/1
4 TABLET, ORALLY DISINTEGRATING ORAL EVERY 6 HOURS PRN
Status: DISCONTINUED | OUTPATIENT
Start: 2025-07-25 | End: 2025-07-26

## 2025-07-25 RX ORDER — SODIUM CHLORIDE 0.9 % (FLUSH) 0.9 %
10 SYRINGE (ML) INJECTION AS NEEDED
Status: DISCONTINUED | OUTPATIENT
Start: 2025-07-25 | End: 2025-07-26

## 2025-07-25 RX ORDER — SODIUM CHLORIDE, SODIUM LACTATE, POTASSIUM CHLORIDE, CALCIUM CHLORIDE 600; 310; 30; 20 MG/100ML; MG/100ML; MG/100ML; MG/100ML
125 INJECTION, SOLUTION INTRAVENOUS CONTINUOUS
Status: DISCONTINUED | OUTPATIENT
Start: 2025-07-25 | End: 2025-07-26

## 2025-07-25 RX ORDER — BUTORPHANOL TARTRATE 2 MG/ML
2 INJECTION, SOLUTION INTRAMUSCULAR; INTRAVENOUS
Status: DISCONTINUED | OUTPATIENT
Start: 2025-07-25 | End: 2025-07-26

## 2025-07-25 RX ORDER — FAMOTIDINE 10 MG/ML
20 INJECTION, SOLUTION INTRAVENOUS 2 TIMES DAILY PRN
Status: DISCONTINUED | OUTPATIENT
Start: 2025-07-25 | End: 2025-07-26

## 2025-07-25 RX ORDER — TERBUTALINE SULFATE 1 MG/ML
0.25 INJECTION SUBCUTANEOUS AS NEEDED
Status: DISCONTINUED | OUTPATIENT
Start: 2025-07-25 | End: 2025-07-26

## 2025-07-25 RX ORDER — FAMOTIDINE 10 MG/ML
20 INJECTION, SOLUTION INTRAVENOUS ONCE AS NEEDED
Status: DISCONTINUED | OUTPATIENT
Start: 2025-07-25 | End: 2025-07-26

## 2025-07-25 RX ORDER — DIPHENHYDRAMINE HYDROCHLORIDE 50 MG/ML
12.5 INJECTION, SOLUTION INTRAMUSCULAR; INTRAVENOUS EVERY 8 HOURS PRN
Status: DISCONTINUED | OUTPATIENT
Start: 2025-07-25 | End: 2025-07-26

## 2025-07-25 RX ORDER — FENTANYL/ROPIVACAINE/NS/PF 2MCG/ML-.2
10 PLASTIC BAG, INJECTION (ML) INJECTION CONTINUOUS
Refills: 0 | Status: DISCONTINUED | OUTPATIENT
Start: 2025-07-25 | End: 2025-07-26

## 2025-07-25 RX ADMIN — SODIUM CHLORIDE, POTASSIUM CHLORIDE, SODIUM LACTATE AND CALCIUM CHLORIDE 125 ML/HR: 600; 310; 30; 20 INJECTION, SOLUTION INTRAVENOUS at 16:25

## 2025-07-25 RX ADMIN — SODIUM CHLORIDE, POTASSIUM CHLORIDE, SODIUM LACTATE AND CALCIUM CHLORIDE 125 ML/HR: 600; 310; 30; 20 INJECTION, SOLUTION INTRAVENOUS at 23:40

## 2025-07-25 RX ADMIN — FAMOTIDINE 20 MG: 10 INJECTION INTRAVENOUS at 21:30

## 2025-07-25 RX ADMIN — Medication 2 MILLI-UNITS/MIN: at 16:42

## 2025-07-25 NOTE — PLAN OF CARE
Problem: Adult Inpatient Plan of Care  Goal: Plan of Care Review  Outcome: Progressing  Flowsheets (Taken 7/25/2025 1751)  Outcome Evaluation: Pt is a term IOL. Hx of pp hemorrhage with first delivery. Second IV in place. Pitocin ordered and started per protocol. Plans to get an epidural when she is ready. OK to continue with plan of care.  Goal: Patient-Specific Goal (Individualized)  Outcome: Progressing  Goal: Absence of Hospital-Acquired Illness or Injury  Outcome: Progressing  Goal: Optimal Comfort and Wellbeing  Outcome: Progressing  Goal: Readiness for Transition of Care  Outcome: Progressing  Intervention: Mutually Develop Transition Plan  Recent Flowsheet Documentation  Taken 7/25/2025 1605 by Madison Alvarez RN  Equipment Currently Used at Home: none  Taken 7/25/2025 1603 by Madison Alvarez RN  Transportation Anticipated: family or friend will provide  Patient/Family Anticipated Services at Transition: none  Patient/Family Anticipates Transition to:   home   home with family     Problem: Labor  Goal: Hemostasis  Outcome: Progressing  Goal: Stable Fetal Wellbeing  Outcome: Progressing  Goal: Effective Progression to Delivery  Outcome: Progressing  Goal: Absence of Infection Signs and Symptoms  Outcome: Progressing  Goal: Acceptable Pain Control  Outcome: Progressing  Goal: Normal Uterine Contraction Pattern  Outcome: Progressing     Problem: Pain Acute  Goal: Optimal Pain Control and Function  Outcome: Progressing   Goal Outcome Evaluation:              Outcome Evaluation: Pt is a term IOL. Hx of pp hemorrhage with first delivery. Second IV in place. Pitocin ordered and started per protocol. Plans to get an epidural when she is ready. OK to continue with plan of care.

## 2025-07-26 PROCEDURE — 10907ZC DRAINAGE OF AMNIOTIC FLUID, THERAPEUTIC FROM PRODUCTS OF CONCEPTION, VIA NATURAL OR ARTIFICIAL OPENING: ICD-10-PCS | Performed by: STUDENT IN AN ORGANIZED HEALTH CARE EDUCATION/TRAINING PROGRAM

## 2025-07-26 PROCEDURE — C1755 CATHETER, INTRASPINAL: HCPCS | Performed by: ANESTHESIOLOGY

## 2025-07-26 PROCEDURE — 10H07YZ INSERTION OF OTHER DEVICE INTO PRODUCTS OF CONCEPTION, VIA NATURAL OR ARTIFICIAL OPENING: ICD-10-PCS | Performed by: OBSTETRICS & GYNECOLOGY

## 2025-07-26 PROCEDURE — 0HQ9XZZ REPAIR PERINEUM SKIN, EXTERNAL APPROACH: ICD-10-PCS | Performed by: STUDENT IN AN ORGANIZED HEALTH CARE EDUCATION/TRAINING PROGRAM

## 2025-07-26 PROCEDURE — 25810000003 LACTATED RINGERS PER 1000 ML: Performed by: OBSTETRICS & GYNECOLOGY

## 2025-07-26 RX ORDER — CALCIUM CARBONATE 500 MG/1
2 TABLET, CHEWABLE ORAL 3 TIMES DAILY PRN
Status: DISCONTINUED | OUTPATIENT
Start: 2025-07-26 | End: 2025-07-28 | Stop reason: HOSPADM

## 2025-07-26 RX ORDER — TRANEXAMIC ACID 10 MG/ML
INJECTION, SOLUTION INTRAVENOUS
Status: DISCONTINUED
Start: 2025-07-26 | End: 2025-07-26 | Stop reason: WASHOUT

## 2025-07-26 RX ORDER — TRANEXAMIC ACID 10 MG/ML
1000 INJECTION, SOLUTION INTRAVENOUS ONCE AS NEEDED
Status: DISCONTINUED | OUTPATIENT
Start: 2025-07-26 | End: 2025-07-28 | Stop reason: HOSPADM

## 2025-07-26 RX ORDER — TRAMADOL HYDROCHLORIDE 50 MG/1
50 TABLET ORAL EVERY 6 HOURS PRN
Status: DISCONTINUED | OUTPATIENT
Start: 2025-07-26 | End: 2025-07-28 | Stop reason: HOSPADM

## 2025-07-26 RX ORDER — OXYTOCIN/0.9 % SODIUM CHLORIDE 30/500 ML
999 PLASTIC BAG, INJECTION (ML) INTRAVENOUS ONCE
Status: COMPLETED | OUTPATIENT
Start: 2025-07-26 | End: 2025-07-26

## 2025-07-26 RX ORDER — METHYLERGONOVINE MALEATE 0.2 MG/ML
INJECTION INTRAVENOUS
Status: DISCONTINUED
Start: 2025-07-26 | End: 2025-07-26 | Stop reason: WASHOUT

## 2025-07-26 RX ORDER — OXYTOCIN/0.9 % SODIUM CHLORIDE 30/500 ML
125 PLASTIC BAG, INJECTION (ML) INTRAVENOUS CONTINUOUS PRN
Status: DISCONTINUED | OUTPATIENT
Start: 2025-07-26 | End: 2025-07-28 | Stop reason: HOSPADM

## 2025-07-26 RX ORDER — CARBOPROST TROMETHAMINE 250 UG/ML
INJECTION, SOLUTION INTRAMUSCULAR
Status: DISCONTINUED
Start: 2025-07-26 | End: 2025-07-26 | Stop reason: WASHOUT

## 2025-07-26 RX ORDER — METHYLERGONOVINE MALEATE 0.2 MG/ML
200 INJECTION INTRAVENOUS ONCE AS NEEDED
Status: DISCONTINUED | OUTPATIENT
Start: 2025-07-26 | End: 2025-07-28 | Stop reason: HOSPADM

## 2025-07-26 RX ORDER — TRANEXAMIC ACID 10 MG/ML
1000 INJECTION, SOLUTION INTRAVENOUS ONCE AS NEEDED
Status: DISCONTINUED | OUTPATIENT
Start: 2025-07-26 | End: 2025-07-26 | Stop reason: SDUPTHER

## 2025-07-26 RX ORDER — HYDROCORTISONE 25 MG/G
CREAM TOPICAL AS NEEDED
Status: DISCONTINUED | OUTPATIENT
Start: 2025-07-26 | End: 2025-07-28 | Stop reason: HOSPADM

## 2025-07-26 RX ORDER — METHYLERGONOVINE MALEATE 0.2 MG/ML
200 INJECTION INTRAVENOUS ONCE AS NEEDED
Status: DISCONTINUED | OUTPATIENT
Start: 2025-07-26 | End: 2025-07-26

## 2025-07-26 RX ORDER — PROMETHAZINE HYDROCHLORIDE 12.5 MG/1
12.5 SUPPOSITORY RECTAL EVERY 6 HOURS PRN
Status: DISCONTINUED | OUTPATIENT
Start: 2025-07-26 | End: 2025-07-28 | Stop reason: HOSPADM

## 2025-07-26 RX ORDER — BISACODYL 10 MG
10 SUPPOSITORY, RECTAL RECTAL DAILY PRN
Status: DISCONTINUED | OUTPATIENT
Start: 2025-07-27 | End: 2025-07-28 | Stop reason: HOSPADM

## 2025-07-26 RX ORDER — MISOPROSTOL 200 UG/1
600 TABLET ORAL ONCE AS NEEDED
Status: DISCONTINUED | OUTPATIENT
Start: 2025-07-26 | End: 2025-07-28 | Stop reason: HOSPADM

## 2025-07-26 RX ORDER — OXYTOCIN/0.9 % SODIUM CHLORIDE 30/500 ML
250 PLASTIC BAG, INJECTION (ML) INTRAVENOUS CONTINUOUS
Status: ACTIVE | OUTPATIENT
Start: 2025-07-26 | End: 2025-07-26

## 2025-07-26 RX ORDER — PROMETHAZINE HYDROCHLORIDE 25 MG/1
25 TABLET ORAL EVERY 6 HOURS PRN
Status: DISCONTINUED | OUTPATIENT
Start: 2025-07-26 | End: 2025-07-28 | Stop reason: HOSPADM

## 2025-07-26 RX ORDER — MISOPROSTOL 200 UG/1
TABLET ORAL
Status: DISCONTINUED
Start: 2025-07-26 | End: 2025-07-26 | Stop reason: WASHOUT

## 2025-07-26 RX ORDER — CARBOPROST TROMETHAMINE 250 UG/ML
250 INJECTION, SOLUTION INTRAMUSCULAR
Status: DISCONTINUED | OUTPATIENT
Start: 2025-07-26 | End: 2025-07-26

## 2025-07-26 RX ORDER — KETOROLAC TROMETHAMINE 15 MG/ML
15 INJECTION, SOLUTION INTRAMUSCULAR; INTRAVENOUS EVERY 6 HOURS PRN
Status: DISCONTINUED | OUTPATIENT
Start: 2025-07-26 | End: 2025-07-28 | Stop reason: HOSPADM

## 2025-07-26 RX ORDER — HYDROXYZINE HYDROCHLORIDE 50 MG/1
50 TABLET, FILM COATED ORAL NIGHTLY PRN
Status: DISCONTINUED | OUTPATIENT
Start: 2025-07-26 | End: 2025-07-28 | Stop reason: HOSPADM

## 2025-07-26 RX ORDER — ONDANSETRON 2 MG/ML
4 INJECTION INTRAMUSCULAR; INTRAVENOUS EVERY 6 HOURS PRN
Status: DISCONTINUED | OUTPATIENT
Start: 2025-07-26 | End: 2025-07-28 | Stop reason: HOSPADM

## 2025-07-26 RX ORDER — ONDANSETRON 4 MG/1
4 TABLET, ORALLY DISINTEGRATING ORAL EVERY 8 HOURS PRN
Status: DISCONTINUED | OUTPATIENT
Start: 2025-07-26 | End: 2025-07-28 | Stop reason: HOSPADM

## 2025-07-26 RX ORDER — DOCUSATE SODIUM 100 MG/1
100 CAPSULE, LIQUID FILLED ORAL 2 TIMES DAILY
Status: DISCONTINUED | OUTPATIENT
Start: 2025-07-26 | End: 2025-07-28 | Stop reason: HOSPADM

## 2025-07-26 RX ORDER — IBUPROFEN 600 MG/1
600 TABLET, FILM COATED ORAL EVERY 6 HOURS PRN
Status: DISCONTINUED | OUTPATIENT
Start: 2025-07-26 | End: 2025-07-28 | Stop reason: HOSPADM

## 2025-07-26 RX ORDER — ACETAMINOPHEN 325 MG/1
650 TABLET ORAL EVERY 6 HOURS PRN
Status: DISCONTINUED | OUTPATIENT
Start: 2025-07-26 | End: 2025-07-28 | Stop reason: HOSPADM

## 2025-07-26 RX ORDER — MISOPROSTOL 200 UG/1
800 TABLET ORAL ONCE AS NEEDED
Status: DISCONTINUED | OUTPATIENT
Start: 2025-07-26 | End: 2025-07-26

## 2025-07-26 RX ADMIN — IBUPROFEN 600 MG: 600 TABLET ORAL at 18:00

## 2025-07-26 RX ADMIN — Medication: at 14:03

## 2025-07-26 RX ADMIN — Medication 250 ML/HR: at 09:39

## 2025-07-26 RX ADMIN — ACETAMINOPHEN 650 MG: 325 TABLET, FILM COATED ORAL at 11:08

## 2025-07-26 RX ADMIN — Medication 999 ML/HR: at 09:22

## 2025-07-26 RX ADMIN — ACETAMINOPHEN 650 MG: 325 TABLET, FILM COATED ORAL at 22:36

## 2025-07-26 RX ADMIN — DOCUSATE SODIUM 100 MG: 100 CAPSULE, LIQUID FILLED ORAL at 22:37

## 2025-07-26 RX ADMIN — IBUPROFEN 600 MG: 600 TABLET ORAL at 11:09

## 2025-07-26 RX ADMIN — SODIUM CHLORIDE, POTASSIUM CHLORIDE, SODIUM LACTATE AND CALCIUM CHLORIDE 125 ML/HR: 600; 310; 30; 20 INJECTION, SOLUTION INTRAVENOUS at 09:04

## 2025-07-26 RX ADMIN — SODIUM CHLORIDE, POTASSIUM CHLORIDE, SODIUM LACTATE AND CALCIUM CHLORIDE 125 ML/HR: 600; 310; 30; 20 INJECTION, SOLUTION INTRAVENOUS at 03:34

## 2025-07-26 RX ADMIN — Medication 10 ML/HR: at 03:10

## 2025-07-26 NOTE — PLAN OF CARE
Problem: Adult Inpatient Plan of Care  Goal: Plan of Care Review  Outcome: Progressing  Flowsheets (Taken 7/26/2025 0550)  Progress: improving  Outcome Evaluation: Term IOL with hx of PP hemorrhage with first delivery. Pt on pitocin. AROM with meconium at 0233 and IUPC placed. Pt received epidural for pain management..  Plan of Care Reviewed With: patient  Goal: Patient-Specific Goal (Individualized)  Outcome: Progressing  Goal: Absence of Hospital-Acquired Illness or Injury  Outcome: Progressing  Intervention: Identify and Manage Fall Risk  Recent Flowsheet Documentation  Taken 7/25/2025 1905 by Filomena Art RN  Safety Promotion/Fall Prevention: safety round/check completed  Goal: Optimal Comfort and Wellbeing  Outcome: Progressing  Intervention: Provide Person-Centered Care  Recent Flowsheet Documentation  Taken 7/25/2025 1905 by Filomena Art RN  Trust Relationship/Rapport:   care explained   questions answered  Goal: Readiness for Transition of Care  Outcome: Progressing     Problem: Labor  Goal: Hemostasis  Outcome: Progressing  Goal: Stable Fetal Wellbeing  Outcome: Progressing  Goal: Effective Progression to Delivery  Outcome: Progressing  Goal: Absence of Infection Signs and Symptoms  Outcome: Progressing  Goal: Acceptable Pain Control  Outcome: Progressing  Goal: Normal Uterine Contraction Pattern  Outcome: Progressing     Problem: Pain Acute  Goal: Optimal Pain Control and Function  Outcome: Progressing  Intervention: Optimize Psychosocial Wellbeing  Recent Flowsheet Documentation  Taken 7/25/2025 1905 by Filomena Art RN  Diversional Activities:   television   smartphone     Problem: Anesthesia/Analgesia, Neuraxial  Goal: Safe, Effective Infusion Delivery  Outcome: Progressing  Goal: Stable Patient-Fetal Status  Outcome: Progressing  Goal: Absence of Infection Signs and Symptoms  Outcome: Progressing  Goal: Nausea and Vomiting Relief  Outcome: Progressing  Goal: Optimal Pain Control and  Function  Outcome: Progressing  Intervention: Prevent or Manage Pain  Recent Flowsheet Documentation  Taken 7/25/2025 1905 by Filomena Art RN  Diversional Activities:   television   smartphone  Goal: Effective Oxygenation and Ventilation  Outcome: Progressing  Goal: Baseline Motor Function Return  Outcome: Progressing  Intervention: Optimize Sensorimotor Function and Safety  Recent Flowsheet Documentation  Taken 7/25/2025 1905 by Filomena Art, RN  Safety Promotion/Fall Prevention: safety round/check completed  Goal: Effective Urinary Elimination  Outcome: Progressing   Goal Outcome Evaluation:  Plan of Care Reviewed With: patient        Progress: improving  Outcome Evaluation: Term IOL with hx of PP hemorrhage with first delivery. Pt on pitocin. AROM with meconium at 0233 and IUPC placed. Pt received epidural for pain management..

## 2025-07-26 NOTE — ANESTHESIA PROCEDURE NOTES
Labor Epidural    Pre-sedation assessment completed: 7/26/2025 2:56 AM    Patient reassessed immediately prior to procedure    Patient location during procedure: OB  Start Time: 7/26/2025 2:56 AM  Stop Time: 7/26/2025 3:05 AM  Indication:at surgeon's request  Performed By  Anesthesiologist: Victoria Valverde MD  Preanesthetic Checklist  Completed: patient identified, IV checked, site marked, risks and benefits discussed, surgical consent, monitors and equipment checked, pre-op evaluation and timeout performed  Additional Notes  39w4d    Prep:  Pt Position:sitting  Sterile Tech:cap, gloves, mask and sterile barrier  Prep:chlorhexidine gluconate and isopropyl alcohol  Monitoring:blood pressure monitoring, continuous pulse oximetry and EKG  Epidural Block Procedure:  Approach:midline  Guidance:landmark technique  Location:L4-L5  Needle Type:Tuohy  Needle Gauge:17  Loss of Resistance Medium: saline  Loss of Resistance: 8cm  Cath Depth at skin:13 cm  Paresthesia: none  Aspiration:negative  Test Dose:negative  Number of Attempts: 1  Post Assessment:  Dressing:occlusive dressing applied and secured with tape  Pt Tolerance:patient tolerated the procedure well with no apparent complications  Complications:no

## 2025-07-26 NOTE — PROGRESS NOTES
"[facility]  Obstetric Progress Note    Subjective     Patient:    The patient feels well.      Objective     Vital Signs Range for the last 24 hours  Temp:  [98.1 °F (36.7 °C)-98.2 °F (36.8 °C)] 98.2 °F (36.8 °C)   Temp src: Oral   BP: (128-139)/(72-88) 128/72   Heart Rate:  [76-98] 76               Device (Oxygen Therapy): room air   Weight:  [145 kg (320 lb)] 145 kg (320 lb)       Flowsheet Rows      Flowsheet Row First Filed Value   Admission Height 162.6 cm (64\") Documented at 07/25/2025 1809   Admission Weight 145 kg (320 lb) Documented at 07/25/2025 1809            Intake/Output last 24 hours:    No intake or output data in the 24 hours ending 07/26/25 0248    Intake/Output this shift:    No intake/output data recorded.    Physical Exam:  General: Patient is comfortable                     Presentation: cephalic   Cervix: Exam by: Method: sterile vaginal exam performed   Dilation: Cervical Dilation (cm): 2-3   Effacement: 70   Station:  -2         Fetal Heart Rate Assessment   Method: Fetal HR Assessment Method: external   Beats/min: Fetal HR (beats/min): 130   Baseline: Fetal HR Baseline: normal range   Variability: Fetal HR Variability: moderate (amplitude range 6 to 25 bpm)   Accels: Fetal HR Accelerations: greater than/equal to 15 bpm, lasting at least 15 seconds   Decels: Fetal HR Decelerations: absent   Tracing Category:       Uterine Assessment   Method: Method: external tocotransducer, palpation, per patient report   Frequency (min): Contraction Frequency (Minutes): 2-3   Ctx Count in 10 min:     Duration:     Intensity: Contraction Intensity: mild by palpation   Intensity by IUPC:     Resting Tone: Uterine Resting Tone: soft by palpation   Resting Tone by IUPC:     [place] Units:     AROM meconium stained  IUPC placed    Assessment & Plan       Pregnancy        Assessment:  1.  Intrauterine pregnancy at 39w4d gestation with reactive fetal status.    2.  labor  with ROM      Plan:  1. Continue " karey Tomlin MD  7/26/2025  02:48 EDT

## 2025-07-26 NOTE — ANESTHESIA POSTPROCEDURE EVALUATION
"Patient: Hodan Buchanan    Procedure Summary       Date: 07/26/25 Room / Location:     Anesthesia Start: 0256 Anesthesia Stop: 0918    Procedure: LABOR ANALGESIA Diagnosis:     Scheduled Providers:  Provider: Victoria Valverde MD    Anesthesia Type: epidural ASA Status: 3            Anesthesia Type: epidural    Vitals  Vitals Value Taken Time   /65 07/26/25 10:00   Temp 37 °C (98.6 °F) 07/26/25 09:29   Pulse 89 07/26/25 10:00   Resp 16 07/26/25 08:01   SpO2 97 % 07/26/25 10:00   Vitals shown include unfiled device data.        Post Anesthesia Care and Evaluation      Comments: /65   Pulse 83   Temp 37 °C (98.6 °F) (Oral)   Resp 16   Ht 162.6 cm (64\")   Wt (!) 145 kg (320 lb)   SpO2 97%   BMI 54.93 kg/m²     MD was available throughout the duration of the labor epidural.      "

## 2025-07-26 NOTE — LACTATION NOTE
This note was copied from a baby's chart.  P2 term baby, 6hrs old, LGA.  Baby has a cleft lip,  Mom reports he has breast fed x2 and BGM's have been WNL.  After waking baby, placing him STS and hand expressing milk to him he latched deeply and Mom's breast was able to fill the cleft. He nursed for 13 minutes, Mom offered second breast but he was too sleepy.   Mom would also like to pump. She has personal breast pump at home. HGP initiated. Helped her  pump and a few drops of milk were given to baby. Discussed HGP operation and how to clean pump parts.   Mom breast fed her first baby with a good milk supply until she had to go back to work then her supply dropped.   Discussed milk production, how to know baby is getting enough milk, feeding cues, expected output, nursing on demand or every 3hrs, encouraged pumping every other feeding or more often depending on baby's cues or his status and encouraged to call for any assistance or questions.

## 2025-07-26 NOTE — H&P
Assessment & Plan     39 and 3/7 weeks gestation.  Not in labor.  Term induction.     Risks, benefits, alternatives and possible complications have been discussed in detail with the patient.  Pre-admission, admission, and post admission procedures and expectations were discussed in detail.  All questions answered, all appropriate consents will be signed at the Hospital. Admission is planned for today.   Augmentation: IV Pitocin induction.    Alfonzo Buchanan is a 30 y.o.  female with EDC 25 at 39 and 3/7 weeks gestation who is being admitted for induction of labor.  Her current obstetrical history is significant maternal obesity..  Patient reports no complaints.   Fetal Movement: normal.    The following portions of the patient's history were reviewed and updated as appropriate: allergies, current medications, past family history, past medical history, past social history, past surgical history, and problem list.     Objective     Vital signs in last 24 hours:  Temp:  [98.1 °F (36.7 °C)-98.2 °F (36.8 °C)] 98.2 °F (36.8 °C)  Heart Rate:  [79-98] 82  BP: (128-139)/(75-88) 128/79    General:   alert, appears stated age, and cooperative                               FHT:  140 BPM, cat 1       Presentations: cephalic   Cervix:    Dilation: 1cm   Effacement: 50%   Station:  -3   Consistency: soft   Position: middle

## 2025-07-26 NOTE — L&D DELIVERY NOTE
[facility]  Vaginal Delivery Note    Patient Name: Hodan Buchanan  :  1995  MRN:  [id number]      Diagnoses:  IUP at 39w4d   BMI 55  Fetal cleft lip/palate    Delivery     Delivery: Vaginal Delivery, unspecified    YOB: 2025   Time of Birth: 9:18 AM     Anesthesia: Epidural    Delivering clinician: Beth Rose MD       Infant    Findings: Viable male infant    Infant observations: Weight: 3935 g (8 lb 10.8 oz)  Observations/Comments:  Ld 1     Apgars: 9  @ 1 minute /    9  @ 5 minutes     Placenta, Cord, and Fluid    Placenta delivered  Spontaneous  at  2025  9:21 AM    Cord: 3 vessels present.   Cord blood obtained: Yes   Cord gases obtained:  No     Repair    Episiotomy: No   Lacerations: 1st degree perineal         Estimated Blood Loss:  Quantitative Blood Loss:  150 mls.            Delivery narrative: The patient is a 30 y.o.  at 39w4d who presented for induction. She received pitocin and AROM. Progressed to C/C/+1 @ 0853. Fetal status reassuring throughout. Pushed with good maternal effort for  of viable male infant  @ 9:18 AM. Head delivered in JAQUI position. Gentle downward traction on the fetal head resulted in spontaneous and atraumatic delivery of the left anterior shoulder followed by right posterior shoulder and body. Infant placed to maternal abdomen where cord was clamped and cut after 30 second delay. 3935 g (8 lb 10.8 oz). Placenta delivered spontaneously, intact with 3 vessel cord. Vagina, perineum and cervix examined. Cervix and rectum intact. 1st degree laceration repaired in usual fashion with 3-0 vicryl suture. Sponge and instrument count correct. Mother and baby recovering good condition.       Beth Rose MD  25  09:39 EDT

## 2025-07-26 NOTE — PLAN OF CARE
Goal Outcome Evaluation:  Plan of Care Reviewed With: patient        Progress: improving  Outcome Evaluation: vss, up ad quyen. voiding adequately. scant lochia. motrin and tylenol working for pain. mom resting at this time.

## 2025-07-26 NOTE — ANESTHESIA PREPROCEDURE EVALUATION
" Anesthesia Evaluation     Patient summary reviewed and Nursing notes reviewed   history of anesthetic complications:  PONV               Airway   Mallampati: II  Dental - normal exam     Pulmonary - negative pulmonary ROS   Cardiovascular     Rhythm: regular    (+) hypertension      Neuro/Psych  (+) numbness  GI/Hepatic/Renal/Endo    (+) morbid obesity    Musculoskeletal (-) negative ROS    Abdominal   (+) obese   Substance History - negative use     OB/GYN    (+) Pregnant, pregnancy induced hypertension        Other                    Anesthesia Plan    ASA 3     epidural     (  39w4d    /72   Pulse 76   Temp 36.8 °C (98.2 °F) (Oral)   Ht 162.6 cm (64\")   Wt (!) 145 kg (320 lb)   BMI 54.93 kg/m²     I have reviewed the patient's history with the patient and the chart, including all pertinent laboratory results and imaging. I have explained the risks of anesthesia including but not limited to dental damage, corneal abrasion, nerve injury, MI, stroke, and death.    )    Anesthetic plan, risks, benefits, and alternatives have been provided, discussed and informed consent has been obtained with: patient and spouse/significant other.    CODE STATUS:    Code Status (Patient has no pulse and is not breathing): CPR (Attempt to Resuscitate)  Medical Interventions (Patient has pulse or is breathing): Full Support  Level Of Support Discussed With: Patient      "

## 2025-07-27 LAB
BASOPHILS # BLD AUTO: 0.03 10*3/MM3 (ref 0–0.2)
BASOPHILS NFR BLD AUTO: 0.3 % (ref 0–1.5)
DEPRECATED RDW RBC AUTO: 42.5 FL (ref 37–54)
EOSINOPHIL # BLD AUTO: 0.11 10*3/MM3 (ref 0–0.4)
EOSINOPHIL NFR BLD AUTO: 1.1 % (ref 0.3–6.2)
ERYTHROCYTE [DISTWIDTH] IN BLOOD BY AUTOMATED COUNT: 12.8 % (ref 12.3–15.4)
HCT VFR BLD AUTO: 34 % (ref 34–46.6)
HGB BLD-MCNC: 11.2 G/DL (ref 12–15.9)
IMM GRANULOCYTES # BLD AUTO: 0.06 10*3/MM3 (ref 0–0.05)
IMM GRANULOCYTES NFR BLD AUTO: 0.6 % (ref 0–0.5)
LYMPHOCYTES # BLD AUTO: 2.57 10*3/MM3 (ref 0.7–3.1)
LYMPHOCYTES NFR BLD AUTO: 26.4 % (ref 19.6–45.3)
MCH RBC QN AUTO: 30.3 PG (ref 26.6–33)
MCHC RBC AUTO-ENTMCNC: 32.9 G/DL (ref 31.5–35.7)
MCV RBC AUTO: 91.9 FL (ref 79–97)
MONOCYTES # BLD AUTO: 0.77 10*3/MM3 (ref 0.1–0.9)
MONOCYTES NFR BLD AUTO: 7.9 % (ref 5–12)
NEUTROPHILS NFR BLD AUTO: 6.19 10*3/MM3 (ref 1.7–7)
NEUTROPHILS NFR BLD AUTO: 63.7 % (ref 42.7–76)
NRBC BLD AUTO-RTO: 0 /100 WBC (ref 0–0.2)
PLATELET # BLD AUTO: 162 10*3/MM3 (ref 140–450)
PMV BLD AUTO: 11 FL (ref 6–12)
RBC # BLD AUTO: 3.7 10*6/MM3 (ref 3.77–5.28)
WBC NRBC COR # BLD AUTO: 9.73 10*3/MM3 (ref 3.4–10.8)

## 2025-07-27 PROCEDURE — 85025 COMPLETE CBC W/AUTO DIFF WBC: CPT | Performed by: STUDENT IN AN ORGANIZED HEALTH CARE EDUCATION/TRAINING PROGRAM

## 2025-07-27 RX ADMIN — IBUPROFEN 600 MG: 600 TABLET ORAL at 08:41

## 2025-07-27 RX ADMIN — DOCUSATE SODIUM 100 MG: 100 CAPSULE, LIQUID FILLED ORAL at 20:48

## 2025-07-27 RX ADMIN — DOCUSATE SODIUM 100 MG: 100 CAPSULE, LIQUID FILLED ORAL at 08:42

## 2025-07-27 RX ADMIN — IBUPROFEN 600 MG: 600 TABLET ORAL at 14:55

## 2025-07-27 NOTE — LACTATION NOTE
This note was copied from a baby's chart.  Rounded on patient.  She is currently attempting to latch baby to the left breast.  He is able to latch with nutritive suck but for short periods.  She reports he does well on the right breast.  Encouraged patient to try for a few minutes on the left but switch to the right if baby is still struggling.  She denies questions at this time.  LC number on WB, will follow as needed.

## 2025-07-27 NOTE — PLAN OF CARE
Goal Outcome Evaluation:              Outcome Evaluation: VSS, up ad quyen, pt is breast feeding, pain well managed with PRN medications, fundal checks WDL, pt needs met at this time

## 2025-07-28 VITALS
DIASTOLIC BLOOD PRESSURE: 85 MMHG | SYSTOLIC BLOOD PRESSURE: 143 MMHG | RESPIRATION RATE: 18 BRPM | BODY MASS INDEX: 50.02 KG/M2 | HEIGHT: 64 IN | OXYGEN SATURATION: 100 % | TEMPERATURE: 97.4 F | HEART RATE: 91 BPM | WEIGHT: 293 LBS

## 2025-07-28 PROBLEM — Z34.90 PREGNANCY: Status: RESOLVED | Noted: 2022-11-07 | Resolved: 2025-07-28

## 2025-07-28 RX ORDER — PSEUDOEPHEDRINE HCL 30 MG
100 TABLET ORAL 2 TIMES DAILY PRN
Qty: 60 CAPSULE | Refills: 0 | Status: SHIPPED | OUTPATIENT
Start: 2025-07-28

## 2025-07-28 RX ORDER — IBUPROFEN 600 MG/1
600 TABLET, FILM COATED ORAL EVERY 6 HOURS PRN
Qty: 40 TABLET | Refills: 0 | Status: SHIPPED | OUTPATIENT
Start: 2025-07-28

## 2025-07-28 RX ADMIN — IBUPROFEN 600 MG: 600 TABLET ORAL at 08:52

## 2025-07-28 RX ADMIN — IBUPROFEN 600 MG: 600 TABLET ORAL at 02:47

## 2025-07-28 RX ADMIN — DOCUSATE SODIUM 100 MG: 100 CAPSULE, LIQUID FILLED ORAL at 08:52

## 2025-07-28 NOTE — PROGRESS NOTES
[facility]  Vaginal Delivery Progress Note    Patient Name: Hodan Buchanan  :  1995  MRN:  [id number]      Subjective   Postpartum Day 1: Vaginal Delivery of a male infant.     The patient feels well without complaints.  Her pain is well controlled.  Reports normal lochia.     The patient plans to breastfeed, bottle feed.    Objective     Vital Signs Range for the last 24 hours  Temperature: Temp:  [97.6 °F (36.4 °C)-98.1 °F (36.7 °C)] 98 °F (36.7 °C)       BP: BP: (118-140)/(72-91) 140/79   Pulse: Heart Rate:  [72-92] 72   Respirations: Resp:  [16-18] 16                       Physical Exam:  General: Awake and alert  Abdomen: Fundus: firm, non tender, U-2 below umbilicus  Extremities:  trace edema, NT     Labs:     Results from last 7 days   Lab Units 25  0046 25  1548   WBC 10*3/mm3 9.73 9.95   HEMOGLOBIN g/dL 11.2* 12.1   HEMATOCRIT % 34.0 36.4   PLATELETS 10*3/mm3 162 174       Prenatal labs results reviewed:  Yes   Rubella:  immune  Rh Status:    RH type   Date Value Ref Range Status   2025 Positive  Final         Assessment & Plan  :   1. PPD1 S/P  - Doing well, continue usual cares.     2. Male infant: with cleft lip/palate but pt reports is latching well. Remaining inpatient until [facility] eval tomorrow. Desire circumcision- r/b/a reviewed and informed consent obtained.          Pregnancy          Mercedes Harrison MD  2025  22:51 EDT

## 2025-07-28 NOTE — LACTATION NOTE
This note was copied from a baby's chart.  Mom reports baby is latching well and does better on the R breast.  Feedings are about 5- 10 min each breast. Denies any nipple discomfort or damage.  Has a HGP at bedside and has use a few times.  Parents are supplementing with formula.  Educated that can pump after feedings and give EBM first, then formula.  This will help establish a good milk supply as well.  Plans to be discharged today.  Has a pump at home.  Educated to expect milk supply day 3-5, symptoms and treatment for engorgement, and info given for O[facility].  Encouraged to call for any concerns.

## 2025-07-28 NOTE — PLAN OF CARE
Goal Outcome Evaluation:  Plan of Care Reviewed With: patient        Progress: improving     VSS, fundus and lochia WDL, up ad quyen, voiding without difficulty, pain control with PO meds, working on breastfeeding.   Problem: Adult Inpatient Plan of Care  Goal: Plan of Care Review  Outcome: Progressing  Flowsheets (Taken 7/28/2025 0547)  Progress: improving  Plan of Care Reviewed With: patient  Goal: Patient-Specific Goal (Individualized)  Outcome: Progressing  Goal: Absence of Hospital-Acquired Illness or Injury  Outcome: Progressing  Intervention: Identify and Manage Fall Risk  Recent Flowsheet Documentation  Taken 7/28/2025 0423 by Libby Ortiz RN  Safety Promotion/Fall Prevention: safety round/check completed  Taken 7/28/2025 0331 by Libby Ortiz RN  Safety Promotion/Fall Prevention: safety round/check completed  Taken 7/28/2025 0247 by Libby Ortiz RN  Safety Promotion/Fall Prevention: safety round/check completed  Taken 7/28/2025 0026 by Libby Ortiz RN  Safety Promotion/Fall Prevention: safety round/check completed  Taken 7/27/2025 2215 by Libby Ortiz RN  Safety Promotion/Fall Prevention: safety round/check completed  Taken 7/27/2025 2048 by Libby Ortiz RN  Safety Promotion/Fall Prevention: safety round/check completed  Goal: Optimal Comfort and Wellbeing  Outcome: Progressing  Intervention: Monitor Pain and Promote Comfort  Recent Flowsheet Documentation  Taken 7/28/2025 0247 by Libby Ortiz RN  Pain Management Interventions: pain medication given  Intervention: Provide Person-Centered Care  Recent Flowsheet Documentation  Taken 7/27/2025 2048 by Libby Ortiz RN  Trust Relationship/Rapport:   care explained   choices provided   questions answered   questions encouraged   thoughts/feelings acknowledged  Goal: Readiness for Transition of Care  Outcome: Progressing     Problem: Labor  Goal: Hemostasis  Outcome: Progressing  Goal: Stable Fetal Wellbeing  Outcome:  Progressing  Goal: Effective Progression to Delivery  Outcome: Progressing  Goal: Absence of Infection Signs and Symptoms  Outcome: Progressing  Goal: Acceptable Pain Control  Outcome: Progressing  Goal: Normal Uterine Contraction Pattern  Outcome: Progressing     Problem: Pain Acute  Goal: Optimal Pain Control and Function  Outcome: Progressing  Intervention: Develop Pain Management Plan  Recent Flowsheet Documentation  Taken 7/28/2025 0247 by Libby Ortiz RN  Pain Management Interventions: pain medication given     Problem: Anesthesia/Analgesia, Neuraxial  Goal: Safe, Effective Infusion Delivery  Outcome: Progressing  Goal: Stable Patient-Fetal Status  Outcome: Progressing  Goal: Absence of Infection Signs and Symptoms  Outcome: Progressing  Goal: Nausea and Vomiting Relief  Outcome: Progressing  Goal: Optimal Pain Control and Function  Outcome: Progressing  Intervention: Prevent or Manage Pain  Recent Flowsheet Documentation  Taken 7/28/2025 0247 by Libby Ortiz RN  Pain Management Interventions: pain medication given  Goal: Effective Oxygenation and Ventilation  Outcome: Progressing  Goal: Baseline Motor Function Return  Outcome: Progressing  Intervention: Optimize Sensorimotor Function and Safety  Recent Flowsheet Documentation  Taken 7/28/2025 0423 by Libby Ortiz RN  Safety Promotion/Fall Prevention: safety round/check completed  Taken 7/28/2025 0331 by Libby Ortiz RN  Safety Promotion/Fall Prevention: safety round/check completed  Taken 7/28/2025 0247 by Libby Ortiz RN  Safety Promotion/Fall Prevention: safety round/check completed  Taken 7/28/2025 0026 by Libby Ortiz RN  Safety Promotion/Fall Prevention: safety round/check completed  Taken 7/27/2025 2215 by Libby Ortiz RN  Safety Promotion/Fall Prevention: safety round/check completed  Taken 7/27/2025 2048 by Libby Ortiz RN  Safety Promotion/Fall Prevention: safety round/check completed  Goal: Effective Urinary  Elimination  Outcome: Progressing     Problem: Skin Injury Risk Increased  Goal: Skin Health and Integrity  Outcome: Progressing  Intervention: Optimize Skin Protection  Recent Flowsheet Documentation  Taken 7/27/2025 2048 by Libby Ortiz RN  Activity Management: up ad quyen     Problem: Postpartum (Vaginal Delivery)  Goal: Successful Parent Role Transition  Outcome: Progressing  Goal: Hemostasis  Outcome: Progressing  Goal: Absence of Infection Signs and Symptoms  Outcome: Progressing  Goal: Anesthesia/Sedation Recovery  Outcome: Progressing  Intervention: Optimize Anesthesia Recovery  Recent Flowsheet Documentation  Taken 7/28/2025 0423 by Libby Ortiz RN  Safety Promotion/Fall Prevention: safety round/check completed  Taken 7/28/2025 0331 by Libby Ortiz RN  Safety Promotion/Fall Prevention: safety round/check completed  Taken 7/28/2025 0247 by Libby Ortiz RN  Safety Promotion/Fall Prevention: safety round/check completed  Taken 7/28/2025 0026 by Libby Ortiz RN  Safety Promotion/Fall Prevention: safety round/check completed  Taken 7/27/2025 2215 by Libby Ortiz RN  Safety Promotion/Fall Prevention: safety round/check completed  Taken 7/27/2025 2048 by Libby Ortiz RN  Safety Promotion/Fall Prevention: safety round/check completed  Goal: Optimal Pain Control and Function  Outcome: Progressing  Intervention: Prevent or Manage Pain  Recent Flowsheet Documentation  Taken 7/28/2025 0247 by Libby Ortiz RN  Pain Management Interventions: pain medication given  Taken 7/27/2025 2048 by Libby Ortiz RN  Perineal Care:   absorbent brief/pad changed   perineal hygiene encouraged   perineal spray bottle/warm water use encouraged   perineum cleansed  Goal: Effective Urinary Elimination  Outcome: Progressing

## 2025-07-28 NOTE — DISCHARGE INSTRUCTIONS
I recommend:  - Light activity such as daily walks will help with healing and recovery.  - Taking your pain medication as prescribed.  - Pelvic rest (nothing in vagina including sex and tampons, no baths or pools) for 6 weeks.      Please call the office if you experience:  - Fever or chills  - Difficulty breathing  - Excessive swelling in 1 leg  - Acute increase in pain  - Increased bleeding (enough to saturate a pad in 1 hour or less)   - Headache that does not improve with rest, medication, or hydration  - Vision changes  - Right sided abdominal pain  - Blood pressure reading >160/100

## 2025-07-28 NOTE — DISCHARGE SUMMARY
Date of Discharge:  2025    Discharge Diagnosis: Pregnancy s/p vaginal delivery    Presenting Problem/History of Present Illness  Pregnancy [Z34.90]  Pregnancy [Z34.90]       Hospital Course  Patient is a 30 y.o. female presented for IOL at term. On 2025,  by Dr. Rose of male infant weighing 8lb 10.8oz. Her post-partum course was uncomplicated and on PPD 2 she was meeting all criteria for discharge including adequate pain control, minimal lochia, and ability to ambulate, void and tolerate PO intake without difficulty. She was discharged home with standard discharge precautions and instructions.       Procedures Performed     Vaginal delivery    Consults:   Consults       No orders found from 2025 to 2025.            Condition on Discharge:   Subjective   Postpartum Day 2 Vaginal Delivery.    The patient feels well without complaints.    Vital Signs  Temp:  [97.4 °F (36.3 °C)-98.1 °F (36.7 °C)] 97.4 °F (36.3 °C)  Heart Rate:  [72-92] 72  Resp:  [14-18] 14  BP: (109-140)/(59-91) 109/59    Physical Exam:   General: Awake and alert   Abdomen: Fundus: firm, non tender    Extremities:  Calves NT bilaterally    Assessment & Plan     PPD2  S/P : Stable for discharge. Instructions reviewed      Discharge Disposition  Home or Self Care    Discharge Medications     Discharge Medications        New Medications        Instructions Start Date   docusate sodium 100 MG capsule   100 mg, Oral, 2 Times Daily PRN      ibuprofen 600 MG tablet  Commonly known as: ADVIL,MOTRIN   600 mg, Oral, Every 6 Hours PRN             Continue These Medications        Instructions Start Date   prenatal vitamin 27-0.8 27-0.8 MG tablet tablet   1 tablet, Daily             Stop These Medications      aspirin 81 MG oral suspension                Activity at Discharge: restrictions reviewed    Follow-up Appointments      Telehealth: 2 weeks  Postpartum Exam: 6 weeks      Test Results Pending at Discharge       [place]  VIVIENNE Bronson  07/28/25  09:10 EDT

## 2025-07-29 ENCOUNTER — MATERNAL SCREENING (OUTPATIENT)
Dept: CALL CENTER | Facility: HOSPITAL | Age: 30
End: 2025-07-29
Payer: COMMERCIAL

## 2025-07-29 NOTE — OUTREACH NOTE
Maternal Screening Survey      Flowsheet Row Responses   Eligibility Eligible   Prep survey completed? Yes   Facility patient discharged from? [facility] - Registered Nurse

## 2025-08-06 ENCOUNTER — MATERNAL SCREENING (OUTPATIENT)
Dept: CALL CENTER | Facility: HOSPITAL | Age: 30
End: 2025-08-06
Payer: COMMERCIAL

## 2025-08-07 ENCOUNTER — MATERNAL SCREENING (OUTPATIENT)
Dept: CALL CENTER | Facility: HOSPITAL | Age: 30
End: 2025-08-07
Payer: COMMERCIAL

## (undated) DEVICE — ENDOPATH PNEUMONEEDLE INSUFFLATION NEEDLES WITH LUER LOCK CONNECTORS 120MM: Brand: ENDOPATH

## (undated) DEVICE — ENDOPATH XCEL BLADELESS TROCARS WITH STABILITY SLEEVES: Brand: ENDOPATH XCEL

## (undated) DEVICE — GLV SURG BIOGEL LTX PF 7

## (undated) DEVICE — ENDOCUT SCISSOR TIP, DISPOSABLE: Brand: RENEW

## (undated) DEVICE — 3M™ STERI-STRIP™ REINFORCED ADHESIVE SKIN CLOSURES, R1547, 1/2 IN X 4 IN (12 MM X 100 MM), 6 STRIPS/ENVELOPE: Brand: 3M™ STERI-STRIP™

## (undated) DEVICE — GLV SURG TRIUMPH CLASSIC PF LTX 7 STRL

## (undated) DEVICE — PK LAP GYN TOWER 40

## (undated) DEVICE — UNDYED BRAIDED (POLYGLACTIN 910), SYNTHETIC ABSORBABLE SUTURE: Brand: COATED VICRYL

## (undated) DEVICE — ENDOPATH XCEL UNIVERSAL TROCAR STABLILITY SLEEVES: Brand: ENDOPATH XCEL